# Patient Record
Sex: MALE | Race: OTHER | NOT HISPANIC OR LATINO
[De-identification: names, ages, dates, MRNs, and addresses within clinical notes are randomized per-mention and may not be internally consistent; named-entity substitution may affect disease eponyms.]

---

## 2017-01-23 ENCOUNTER — APPOINTMENT (OUTPATIENT)
Dept: ENDOCRINOLOGY | Facility: CLINIC | Age: 47
End: 2017-01-23

## 2017-07-13 ENCOUNTER — APPOINTMENT (OUTPATIENT)
Dept: ENDOCRINOLOGY | Facility: CLINIC | Age: 47
End: 2017-07-13

## 2017-07-13 VITALS
DIASTOLIC BLOOD PRESSURE: 76 MMHG | HEIGHT: 73 IN | SYSTOLIC BLOOD PRESSURE: 120 MMHG | BODY MASS INDEX: 28.76 KG/M2 | OXYGEN SATURATION: 98 % | WEIGHT: 217 LBS | HEART RATE: 72 BPM

## 2017-07-13 DIAGNOSIS — F17.200 NICOTINE DEPENDENCE, UNSPECIFIED, UNCOMPLICATED: ICD-10-CM

## 2017-07-13 LAB
GLUCOSE BLDC GLUCOMTR-MCNC: 167
HBA1C MFR BLD HPLC: 9.9

## 2017-07-14 LAB
ALBUMIN SERPL ELPH-MCNC: 5.1 G/DL
ALP BLD-CCNC: 74 U/L
ALT SERPL-CCNC: 21 U/L
ANION GAP SERPL CALC-SCNC: 17 MMOL/L
AST SERPL-CCNC: 15 U/L
BILIRUB SERPL-MCNC: 0.6 MG/DL
BUN SERPL-MCNC: 30 MG/DL
CALCIUM SERPL-MCNC: 10.5 MG/DL
CHLORIDE SERPL-SCNC: 102 MMOL/L
CHOLEST SERPL-MCNC: 212 MG/DL
CHOLEST/HDLC SERPL: 4 RATIO
CO2 SERPL-SCNC: 22 MMOL/L
CREAT SERPL-MCNC: 1.05 MG/DL
CREAT SPEC-SCNC: 151 MG/DL
GLUCOSE SERPL-MCNC: 84 MG/DL
HDLC SERPL-MCNC: 53 MG/DL
LDLC SERPL CALC-MCNC: 120 MG/DL
MICROALBUMIN 24H UR DL<=1MG/L-MCNC: 7.9 MG/DL
MICROALBUMIN/CREAT 24H UR-RTO: 52 MG/G
POTASSIUM SERPL-SCNC: 4.6 MMOL/L
PROT SERPL-MCNC: 7.9 G/DL
SODIUM SERPL-SCNC: 141 MMOL/L
TRIGL SERPL-MCNC: 193 MG/DL

## 2017-11-02 ENCOUNTER — APPOINTMENT (OUTPATIENT)
Dept: ENDOCRINOLOGY | Facility: CLINIC | Age: 47
End: 2017-11-02
Payer: COMMERCIAL

## 2017-11-02 VITALS
OXYGEN SATURATION: 99 % | BODY MASS INDEX: 30.22 KG/M2 | HEIGHT: 73 IN | SYSTOLIC BLOOD PRESSURE: 110 MMHG | DIASTOLIC BLOOD PRESSURE: 70 MMHG | WEIGHT: 228 LBS | HEART RATE: 83 BPM

## 2017-11-02 LAB
GLUCOSE BLDC GLUCOMTR-MCNC: 142
HBA1C MFR BLD HPLC: 9.3

## 2017-11-02 PROCEDURE — 82962 GLUCOSE BLOOD TEST: CPT

## 2017-11-02 PROCEDURE — 83036 HEMOGLOBIN GLYCOSYLATED A1C: CPT | Mod: QW

## 2017-11-02 PROCEDURE — 99215 OFFICE O/P EST HI 40 MIN: CPT | Mod: 25

## 2017-11-03 LAB
CHOLEST SERPL-MCNC: 234 MG/DL
CHOLEST/HDLC SERPL: 4.9 RATIO
HDLC SERPL-MCNC: 48 MG/DL
LDLC SERPL CALC-MCNC: 134 MG/DL
TRIGL SERPL-MCNC: 261 MG/DL

## 2018-03-08 ENCOUNTER — APPOINTMENT (OUTPATIENT)
Dept: ENDOCRINOLOGY | Facility: CLINIC | Age: 48
End: 2018-03-08

## 2018-03-23 ENCOUNTER — APPOINTMENT (OUTPATIENT)
Dept: OPHTHALMOLOGY | Facility: CLINIC | Age: 48
End: 2018-03-23
Payer: COMMERCIAL

## 2018-03-23 DIAGNOSIS — H40.003 PREGLAUCOMA, UNSPECIFIED, BILATERAL: ICD-10-CM

## 2018-03-23 DIAGNOSIS — E11.3293 TYPE 2 DIABETES MELLITUS WITH MILD NONPROLIFERATIVE DIABETIC RETINOPATHY WITHOUT MACULAR EDEMA, BILATERAL: ICD-10-CM

## 2018-03-23 PROCEDURE — 92134 CPTRZ OPH DX IMG PST SGM RTA: CPT

## 2018-03-23 PROCEDURE — 92004 COMPRE OPH EXAM NEW PT 1/>: CPT

## 2018-03-27 ENCOUNTER — RX RENEWAL (OUTPATIENT)
Age: 48
End: 2018-03-27

## 2018-03-28 PROBLEM — E11.3293 MILD NONPROLIFERATIVE DIABETIC RETINOPATHY OF BOTH EYES WITHOUT MACULAR EDEMA ASSOCIATED WITH TYPE 2 DIABETES MELLITUS: Status: ACTIVE | Noted: 2018-03-28

## 2018-03-28 PROBLEM — H40.003 GLAUCOMA SUSPECT, BILATERAL: Status: ACTIVE | Noted: 2018-03-28

## 2018-05-03 ENCOUNTER — APPOINTMENT (OUTPATIENT)
Dept: ENDOCRINOLOGY | Facility: CLINIC | Age: 48
End: 2018-05-03

## 2018-06-14 ENCOUNTER — APPOINTMENT (OUTPATIENT)
Dept: CARDIOLOGY | Facility: CLINIC | Age: 48
End: 2018-06-14
Payer: COMMERCIAL

## 2018-06-14 ENCOUNTER — NON-APPOINTMENT (OUTPATIENT)
Age: 48
End: 2018-06-14

## 2018-06-14 VITALS
HEIGHT: 73 IN | WEIGHT: 236 LBS | HEART RATE: 83 BPM | TEMPERATURE: 98.9 F | SYSTOLIC BLOOD PRESSURE: 125 MMHG | DIASTOLIC BLOOD PRESSURE: 80 MMHG | OXYGEN SATURATION: 96 % | BODY MASS INDEX: 31.28 KG/M2

## 2018-06-14 PROCEDURE — 99396 PREV VISIT EST AGE 40-64: CPT

## 2018-06-14 PROCEDURE — 93000 ELECTROCARDIOGRAM COMPLETE: CPT

## 2018-06-14 PROCEDURE — 99214 OFFICE O/P EST MOD 30 MIN: CPT | Mod: 25

## 2018-06-15 LAB
25(OH)D3 SERPL-MCNC: 18.8 NG/ML
ALBUMIN SERPL ELPH-MCNC: 4.4 G/DL
ALP BLD-CCNC: 81 U/L
ALT SERPL-CCNC: 31 U/L
ANION GAP SERPL CALC-SCNC: 17 MMOL/L
AST SERPL-CCNC: 23 U/L
BASOPHILS # BLD AUTO: 0.03 K/UL
BASOPHILS NFR BLD AUTO: 0.5 %
BILIRUB SERPL-MCNC: 0.6 MG/DL
BUN SERPL-MCNC: 17 MG/DL
CALCIUM SERPL-MCNC: 9.7 MG/DL
CHLORIDE SERPL-SCNC: 98 MMOL/L
CHOLEST SERPL-MCNC: 193 MG/DL
CHOLEST/HDLC SERPL: 4.6 RATIO
CO2 SERPL-SCNC: 22 MMOL/L
CREAT SERPL-MCNC: 0.91 MG/DL
EOSINOPHIL # BLD AUTO: 0.37 K/UL
EOSINOPHIL NFR BLD AUTO: 6.1 %
ESTIMATED AVERAGE GLUCOSE: 289 MG/DL
GLUCOSE SERPL-MCNC: 263 MG/DL
HBA1C MFR BLD HPLC: 11.7 %
HCT VFR BLD CALC: 49 %
HDLC SERPL-MCNC: 42 MG/DL
HGB BLD-MCNC: 16.4 G/DL
IMM GRANULOCYTES NFR BLD AUTO: 1.5 %
LDLC SERPL CALC-MCNC: 87 MG/DL
LYMPHOCYTES # BLD AUTO: 2.1 K/UL
LYMPHOCYTES NFR BLD AUTO: 34.7 %
MAN DIFF?: NORMAL
MCHC RBC-ENTMCNC: 31.7 PG
MCHC RBC-ENTMCNC: 33.5 GM/DL
MCV RBC AUTO: 94.8 FL
MONOCYTES # BLD AUTO: 0.31 K/UL
MONOCYTES NFR BLD AUTO: 5.1 %
NEUTROPHILS # BLD AUTO: 3.15 K/UL
NEUTROPHILS NFR BLD AUTO: 52.1 %
PLATELET # BLD AUTO: 203 K/UL
POTASSIUM SERPL-SCNC: 5 MMOL/L
PROT SERPL-MCNC: 7.1 G/DL
RBC # BLD: 5.17 M/UL
RBC # FLD: 13.3 %
SODIUM SERPL-SCNC: 137 MMOL/L
TRIGL SERPL-MCNC: 320 MG/DL
WBC # FLD AUTO: 6.05 K/UL

## 2018-06-16 LAB
T4 SERPL-MCNC: 8.1 UG/DL
TSH SERPL-ACNC: 2.39 UIU/ML

## 2018-08-07 ENCOUNTER — APPOINTMENT (OUTPATIENT)
Dept: ENDOCRINOLOGY | Facility: CLINIC | Age: 48
End: 2018-08-07
Payer: COMMERCIAL

## 2018-08-07 VITALS
BODY MASS INDEX: 30.62 KG/M2 | HEIGHT: 73 IN | SYSTOLIC BLOOD PRESSURE: 110 MMHG | HEART RATE: 86 BPM | DIASTOLIC BLOOD PRESSURE: 68 MMHG | OXYGEN SATURATION: 97 % | WEIGHT: 231 LBS

## 2018-08-07 PROCEDURE — 99215 OFFICE O/P EST HI 40 MIN: CPT

## 2018-08-08 LAB
CREAT SPEC-SCNC: 55 MG/DL
MICROALBUMIN 24H UR DL<=1MG/L-MCNC: 2.2 MG/DL
MICROALBUMIN/CREAT 24H UR-RTO: 40 MG/G

## 2018-10-18 ENCOUNTER — APPOINTMENT (OUTPATIENT)
Dept: CARDIOLOGY | Facility: CLINIC | Age: 48
End: 2018-10-18

## 2018-11-08 ENCOUNTER — APPOINTMENT (OUTPATIENT)
Dept: ENDOCRINOLOGY | Facility: CLINIC | Age: 48
End: 2018-11-08
Payer: COMMERCIAL

## 2018-11-08 VITALS
SYSTOLIC BLOOD PRESSURE: 140 MMHG | DIASTOLIC BLOOD PRESSURE: 80 MMHG | HEIGHT: 73 IN | OXYGEN SATURATION: 98 % | WEIGHT: 250 LBS | BODY MASS INDEX: 33.13 KG/M2 | HEART RATE: 108 BPM

## 2018-11-08 LAB
GLUCOSE BLDC GLUCOMTR-MCNC: 200
HBA1C MFR BLD HPLC: 9.4

## 2018-11-08 PROCEDURE — 95251 CONT GLUC MNTR ANALYSIS I&R: CPT

## 2018-11-08 PROCEDURE — 83036 HEMOGLOBIN GLYCOSYLATED A1C: CPT | Mod: QW

## 2018-11-08 PROCEDURE — 82962 GLUCOSE BLOOD TEST: CPT

## 2018-11-08 PROCEDURE — 99215 OFFICE O/P EST HI 40 MIN: CPT | Mod: 25

## 2018-11-13 LAB
25(OH)D3 SERPL-MCNC: 19.8 NG/ML
ANION GAP SERPL CALC-SCNC: 16 MMOL/L
BUN SERPL-MCNC: 18 MG/DL
CALCIUM SERPL-MCNC: 10 MG/DL
CHLORIDE SERPL-SCNC: 101 MMOL/L
CO2 SERPL-SCNC: 25 MMOL/L
CREAT SERPL-MCNC: 1.23 MG/DL
GLUCOSE SERPL-MCNC: 96 MG/DL
POTASSIUM SERPL-SCNC: 4.1 MMOL/L
SODIUM SERPL-SCNC: 142 MMOL/L
VIT B12 SERPL-MCNC: 1210 PG/ML

## 2018-11-29 ENCOUNTER — RX RENEWAL (OUTPATIENT)
Age: 48
End: 2018-11-29

## 2018-12-20 ENCOUNTER — APPOINTMENT (OUTPATIENT)
Dept: ENDOCRINOLOGY | Facility: CLINIC | Age: 48
End: 2018-12-20

## 2018-12-21 ENCOUNTER — CLINICAL ADVICE (OUTPATIENT)
Age: 48
End: 2018-12-21

## 2019-02-11 ENCOUNTER — APPOINTMENT (OUTPATIENT)
Dept: ENDOCRINOLOGY | Facility: CLINIC | Age: 49
End: 2019-02-11
Payer: COMMERCIAL

## 2019-02-11 VITALS
SYSTOLIC BLOOD PRESSURE: 132 MMHG | BODY MASS INDEX: 32.98 KG/M2 | HEART RATE: 102 BPM | OXYGEN SATURATION: 98 % | DIASTOLIC BLOOD PRESSURE: 80 MMHG | WEIGHT: 250 LBS

## 2019-02-11 LAB
GLUCOSE BLDC GLUCOMTR-MCNC: 179
HBA1C MFR BLD HPLC: 7.1

## 2019-02-11 PROCEDURE — 99214 OFFICE O/P EST MOD 30 MIN: CPT | Mod: 25

## 2019-02-11 PROCEDURE — 95251 CONT GLUC MNTR ANALYSIS I&R: CPT

## 2019-02-11 PROCEDURE — 82962 GLUCOSE BLOOD TEST: CPT

## 2019-02-11 PROCEDURE — 83036 HEMOGLOBIN GLYCOSYLATED A1C: CPT | Mod: QW

## 2019-05-20 ENCOUNTER — APPOINTMENT (OUTPATIENT)
Dept: ENDOCRINOLOGY | Facility: CLINIC | Age: 49
End: 2019-05-20
Payer: COMMERCIAL

## 2019-05-30 ENCOUNTER — APPOINTMENT (OUTPATIENT)
Dept: ENDOCRINOLOGY | Facility: CLINIC | Age: 49
End: 2019-05-30
Payer: COMMERCIAL

## 2019-05-30 VITALS
HEART RATE: 95 BPM | SYSTOLIC BLOOD PRESSURE: 130 MMHG | DIASTOLIC BLOOD PRESSURE: 70 MMHG | OXYGEN SATURATION: 97 % | HEIGHT: 73 IN | WEIGHT: 247 LBS | BODY MASS INDEX: 32.74 KG/M2

## 2019-05-30 DIAGNOSIS — Z87.891 PERSONAL HISTORY OF NICOTINE DEPENDENCE: ICD-10-CM

## 2019-05-30 LAB
GLUCOSE BLDC GLUCOMTR-MCNC: 224
HBA1C MFR BLD HPLC: 9.1

## 2019-05-30 PROCEDURE — 83036 HEMOGLOBIN GLYCOSYLATED A1C: CPT | Mod: QW

## 2019-05-30 PROCEDURE — 95251 CONT GLUC MNTR ANALYSIS I&R: CPT

## 2019-05-30 PROCEDURE — 99215 OFFICE O/P EST HI 40 MIN: CPT | Mod: 25

## 2019-05-30 PROCEDURE — 82962 GLUCOSE BLOOD TEST: CPT

## 2019-05-31 LAB
ALBUMIN SERPL ELPH-MCNC: 4.5 G/DL
ALP BLD-CCNC: 91 U/L
ALT SERPL-CCNC: 51 U/L
ANION GAP SERPL CALC-SCNC: 14 MMOL/L
AST SERPL-CCNC: 30 U/L
BILIRUB SERPL-MCNC: 0.4 MG/DL
BUN SERPL-MCNC: 19 MG/DL
CALCIUM SERPL-MCNC: 10.1 MG/DL
CHLORIDE SERPL-SCNC: 101 MMOL/L
CHOLEST SERPL-MCNC: 168 MG/DL
CHOLEST/HDLC SERPL: 4.4 RATIO
CO2 SERPL-SCNC: 24 MMOL/L
CREAT SERPL-MCNC: 0.91 MG/DL
GLUCOSE SERPL-MCNC: 214 MG/DL
HDLC SERPL-MCNC: 38 MG/DL
LDLC SERPL CALC-MCNC: 62 MG/DL
POTASSIUM SERPL-SCNC: 4.6 MMOL/L
PROT SERPL-MCNC: 7.1 G/DL
SODIUM SERPL-SCNC: 138 MMOL/L
TRIGL SERPL-MCNC: 342 MG/DL

## 2019-10-18 ENCOUNTER — MEDICATION RENEWAL (OUTPATIENT)
Age: 49
End: 2019-10-18

## 2019-10-18 ENCOUNTER — RX RENEWAL (OUTPATIENT)
Age: 49
End: 2019-10-18

## 2019-11-04 ENCOUNTER — RX RENEWAL (OUTPATIENT)
Age: 49
End: 2019-11-04

## 2019-11-04 ENCOUNTER — MEDICATION RENEWAL (OUTPATIENT)
Age: 49
End: 2019-11-04

## 2019-11-05 ENCOUNTER — RX RENEWAL (OUTPATIENT)
Age: 49
End: 2019-11-05

## 2020-07-21 ENCOUNTER — APPOINTMENT (OUTPATIENT)
Dept: ENDOCRINOLOGY | Facility: CLINIC | Age: 50
End: 2020-07-21
Payer: COMMERCIAL

## 2020-07-21 VITALS
OXYGEN SATURATION: 97 % | DIASTOLIC BLOOD PRESSURE: 74 MMHG | BODY MASS INDEX: 30.62 KG/M2 | HEART RATE: 77 BPM | WEIGHT: 231 LBS | HEIGHT: 73 IN | SYSTOLIC BLOOD PRESSURE: 120 MMHG

## 2020-07-21 DIAGNOSIS — E11.65 TYPE 2 DIABETES MELLITUS WITH HYPERGLYCEMIA: ICD-10-CM

## 2020-07-21 LAB — GLUCOSE BLDC GLUCOMTR-MCNC: 244

## 2020-07-21 PROCEDURE — 99215 OFFICE O/P EST HI 40 MIN: CPT | Mod: 25

## 2020-07-21 PROCEDURE — 82962 GLUCOSE BLOOD TEST: CPT

## 2020-07-21 NOTE — REVIEW OF SYSTEMS
[Dry Skin] : dry skin [Fatigue] : no fatigue [Recent Weight Gain (___ Lbs)] : no recent weight gain [Decreased Appetite] : appetite not decreased [Recent Weight Loss (___ Lbs)] : no recent weight loss [Dry Eyes] : no dryness [Visual Field Defect] : no visual field defect [Eye Pain] : no pain [Neck Pain] : no neck pain [Dysphagia] : no dysphagia [Dysphonia] : no dysphonia [Chest Pain] : no chest pain [Shortness Of Breath] : no shortness of breath [Palpitations] : no palpitations [Lower Ext Edema] : no lower extremity edema [SOB on Exertion] : no shortness of breath on exertion [Cough] : no cough [Constipation] : no constipation [Abdominal Pain] : no abdominal pain [Nausea] : no nausea [Vomiting] : no vomiting [Diarrhea] : no diarrhea [Dysuria] : no dysuria [Polyuria] : no polyuria [Muscle Weakness] : no muscle weakness [Acanthosis] : no acanthosis  [Joint Pain] : no joint pain [Dizziness] : no dizziness [Headaches] : no headaches [Depression] : no depression [Insomnia] : no insomnia [Tremors] : no tremors [Polydipsia] : no polydipsia [Anxiety] : no anxiety [Heat Intolerance] : no heat intolerance [Easy Bleeding] : no ~M tendency for easy bleeding [Cold Intolerance] : no cold intolerance [Easy Bruising] : no tendency for easy bruising [Swelling] : no swelling

## 2020-07-21 NOTE — ASSESSMENT
[FreeTextEntry1] : Choco Enriquez is a 49 yr old female, who presents today to establish care  in regards type 2 diabetes.  \par She has seen Dr. Harman Hyde so far for his care, now recently moved here.\par \par Currently taking  Metformin 1000 twice daily, Trulicity 1.5 and Tresiba 160 units daily and Novolog 60 units before meals.  \par A1C   was 9.1% in 5/2019.\par Has not checked his sugars in months.\par \par Medication changes:\par Will refill sensors, patient to start checking sugars.\par Discussed with him how dangerous it is to take insulin that too in big doses without checking sugars, he voiced understanding.\par To continue all his meds.\par  To check sugars 4 x a day  at different times.\par Diet and exercise reviewed.\par Hypoglycemia reviewed.\par  Eyes:no  active issues, not up to date for exams. to schedule for this year\par Feet: no  active issues, no  neuropathy. up to date for podiatric care. Diabetic foot care reviewed.\par \par  Lipids: Dyslipidemia: on  statin/ anti-lipid treatment. Last LDL: 62, will repeat this year.\par \par  Renal/ B/P : B/P wnl ,on ACE/ ARB. will check for proteinuria.  \par Weight:Overweight    Balanced diet and exercise reviewed.\par \par Vitamin D def: will check levels this visit.\par \par  EDUCATION: ADA diet (30-50 gm carbohydrates with each meal, 15 grams with snacks. Balance with lean proteins and low fat diet.) Exercise daily per ability, work up to 30 minutes a day. Medication, risks and benefits reviewed. Glucose testing and insulin administration for glycemic management.\par  Influenza vaccination \par \par FOLLOWUP@4 months with labs\par

## 2020-07-21 NOTE — HISTORY OF PRESENT ILLNESS
[FreeTextEntry1] : Choco Enriquez is a 49 yr old female, who presents today to establish care  in regards type 2 diabetes.  \par Per patient , has been diabetic since 10 years.\par Family history includes mother with diabetes\par \par She has seen Dr. Harman Hyde so far for his care, now recently moved here.\par \par Currently taking  Metformin 1000 twice daily, Trulicity 1.5 and Tresiba 160 units daily and Novolog 60 units before meals.  \par A1C   was 9.1% in 5/2019.\par \par He had free style kaila, he ran out of sensors in April, since then he has also not checked sugars at all.\par \par Denies feeling lows.\par \par Says diet is not bad, avoids junk.\par Eats mostly 1 meal  a day, other times has unsweetened coffee , fruits.\par \par Diabetic History\par ER Visits:  none\par Hospitalizations:   none\par Diet Therapy: now watching his diet\par Diabetic Education:   no\par Exercise:   not very  active\par Last eye exam: gets yearly, no changes, has to schedule for this year. \par \par Takes MVI daily, no extra vitamin D.\par Takes statin daily.\par \par \par

## 2020-07-21 NOTE — PHYSICAL EXAM
[Alert] : alert [Well Nourished] : well nourished [Healthy Appearance] : healthy appearance [No Acute Distress] : no acute distress [Normal Sclera/Conjunctiva] : normal sclera/conjunctiva [No Proptosis] : no proptosis [No Lid Lag] : no lid lag [No Neck Mass] : no neck mass was observed [Supple] : the neck was supple [Thyroid Not Enlarged] : the thyroid was not enlarged [No Thyroid Nodules] : no palpable thyroid nodules [No Respiratory Distress] : no respiratory distress [No Accessory Muscle Use] : no accessory muscle use [Normal Rate and Effort] : normal respiratory rate and effort [Clear to Auscultation] : lungs were clear to auscultation bilaterally [Normal S1, S2] : normal S1 and S2 [No Murmurs] : no murmurs [Normal Rate] : heart rate was normal [Regular Rhythm] : with a regular rhythm [Normal Bowel Sounds] : normal bowel sounds [Not Tender] : non-tender [Not Distended] : not distended [Soft] : abdomen soft [Normal Supraclavicular Nodes] : no supraclavicular lymphadenopathy [Normal Anterior Cervical Nodes] : no anterior cervical lymphadenopathy [Normal Posterior Cervical Nodes] : no posterior cervical lymphadenopathy [Normal Gait] : normal gait [No Clubbing, Cyanosis] : no clubbing  or cyanosis of the fingernails [No Joint Swelling] : no joint swelling seen [No Rash] : no rash [No Skin Lesions] : no skin lesions [No Sensory Deficits] : the sensory exam was normal to light touch and pinprick [No Motor Deficits] : the motor exam was normal [No Tremors] : no tremors [Oriented x3] : oriented to person, place, and time [Normal Affect] : the affect was normal [Normal Insight/Judgement] : insight and judgment were intact [Normal Mood] : the mood was normal [Right Foot Was Examined] : right foot ~C was examined [Left Foot Was Examined] : left foot ~C was examined [Normal] : normal [Acanthosis Nigricans] : no acanthosis nigricans [Diminished Throughout Both Feet] : normal tactile sensation with monofilament testing throughout both feet

## 2020-10-02 ENCOUNTER — LABORATORY RESULT (OUTPATIENT)
Age: 50
End: 2020-10-02

## 2020-11-24 ENCOUNTER — APPOINTMENT (OUTPATIENT)
Dept: ENDOCRINOLOGY | Facility: CLINIC | Age: 50
End: 2020-11-24

## 2021-01-12 ENCOUNTER — APPOINTMENT (OUTPATIENT)
Dept: ENDOCRINOLOGY | Facility: CLINIC | Age: 51
End: 2021-01-12
Payer: COMMERCIAL

## 2021-01-12 VITALS
OXYGEN SATURATION: 97 % | BODY MASS INDEX: 31.14 KG/M2 | DIASTOLIC BLOOD PRESSURE: 80 MMHG | HEART RATE: 97 BPM | SYSTOLIC BLOOD PRESSURE: 130 MMHG | WEIGHT: 235 LBS | HEIGHT: 73 IN

## 2021-01-12 LAB — GLUCOSE BLDC GLUCOMTR-MCNC: 94

## 2021-01-12 PROCEDURE — 99214 OFFICE O/P EST MOD 30 MIN: CPT | Mod: 25

## 2021-01-12 PROCEDURE — 99072 ADDL SUPL MATRL&STAF TM PHE: CPT

## 2021-01-12 PROCEDURE — 95251 CONT GLUC MNTR ANALYSIS I&R: CPT

## 2021-01-12 PROCEDURE — 82962 GLUCOSE BLOOD TEST: CPT

## 2021-01-12 RX ORDER — PEN NEEDLE, DIABETIC 29 G X1/2"
32G X 4 MM NEEDLE, DISPOSABLE MISCELLANEOUS
Qty: 5 | Refills: 3 | Status: DISCONTINUED | COMMUNITY
Start: 2018-11-29 | End: 2021-01-12

## 2021-01-12 NOTE — PHYSICAL EXAM
[Alert] : alert [Well Nourished] : well nourished [Healthy Appearance] : healthy appearance [No Acute Distress] : no acute distress [Normal Sclera/Conjunctiva] : normal sclera/conjunctiva [No Proptosis] : no proptosis [No Lid Lag] : no lid lag [No Neck Mass] : no neck mass was observed [Supple] : the neck was supple [Thyroid Not Enlarged] : the thyroid was not enlarged [No Thyroid Nodules] : no palpable thyroid nodules [No Respiratory Distress] : no respiratory distress [No Accessory Muscle Use] : no accessory muscle use [Normal Rate and Effort] : normal respiratory rate and effort [Clear to Auscultation] : lungs were clear to auscultation bilaterally [Normal S1, S2] : normal S1 and S2 [No Murmurs] : no murmurs [Normal Rate] : heart rate was normal [Regular Rhythm] : with a regular rhythm [Normal Bowel Sounds] : normal bowel sounds [Not Tender] : non-tender [Not Distended] : not distended [Soft] : abdomen soft [Normal Supraclavicular Nodes] : no supraclavicular lymphadenopathy [Normal Anterior Cervical Nodes] : no anterior cervical lymphadenopathy [No Rash] : no rash [No Skin Lesions] : no skin lesions [Right Foot Was Examined] : right foot ~C was examined [Left Foot Was Examined] : left foot ~C was examined [Normal] : normal [Oriented x3] : oriented to person, place, and time [Normal Affect] : the affect was normal [Normal Insight/Judgement] : insight and judgment were intact [Normal Mood] : the mood was normal [Acanthosis Nigricans] : no acanthosis nigricans [Diminished Throughout Both Feet] : normal tactile sensation with monofilament testing throughout both feet

## 2021-01-12 NOTE — HISTORY OF PRESENT ILLNESS
[FreeTextEntry1] : Choco Enriquez is a 49 yr old female, who presents today for follow up   in regards type 2 diabetes.  \par Per patient , has been diabetic since 10 years.\par Family history includes mother with diabetes\par \par She has seen Dr. Harman Hyde so far for his care, now recently moved here.\par \par Currently taking  Metformin 1000 twice daily, Trulicity 1.5 mg weekly, Tresiba 160 units daily and Novolog 60 units before meals.  \par \par Admits to missing dinner doses a lot.\par \par A1C   was 9.1% in 5/2019, 8.6% in 10/2020\par Fingerstick today 94\par \par He has free style kaila.\par Downloaded in clinic today:\par From dec 30th till Jan 12:\par Avg glucose: 147\par Glu variability: 44.5%\par In target range:62%\par Above range:29%\par Below range: 9%\par \par Denies feeling lows.\par Highs if he skips dinner dose, if he takes his meds, his sugars are in good ramnge, rare lows if he takes insulin forgets to eat.\par \par Says diet is not bad, avoids junk.\par Eats mostly 1 meal  a day, other times has unsweetened coffee , fruits.\par \par Diabetic History\par ER Visits:  none\par Hospitalizations:   none\par Diet Therapy: now watching his diet\par Diabetic Education:   no\par Exercise:   not very  active\par Last eye exam: gets yearly, no changes, has to schedule for this year. \par \par Takes MVI daily, no extra vitamin D.\par Takes statin daily.\par \par \par

## 2021-01-12 NOTE — REVIEW OF SYSTEMS
[Dry Skin] : dry skin [Fatigue] : no fatigue [Decreased Appetite] : appetite not decreased [Recent Weight Gain (___ Lbs)] : no recent weight gain [Recent Weight Loss (___ Lbs)] : no recent weight loss [Visual Field Defect] : no visual field defect [Dry Eyes] : no dryness [Eye Pain] : no pain [Dysphagia] : no dysphagia [Neck Pain] : no neck pain [Dysphonia] : no dysphonia [Chest Pain] : no chest pain [Palpitations] : no palpitations [Lower Ext Edema] : no lower extremity edema [Shortness Of Breath] : no shortness of breath [Cough] : no cough [SOB on Exertion] : no shortness of breath on exertion [Nausea] : no nausea [Constipation] : no constipation [Abdominal Pain] : no abdominal pain [Vomiting] : no vomiting [Diarrhea] : no diarrhea [Polyuria] : no polyuria [Dysuria] : no dysuria [Joint Pain] : no joint pain [Muscle Weakness] : no muscle weakness [Acanthosis] : no acanthosis  [Headaches] : no headaches [Dizziness] : no dizziness [Tremors] : no tremors [Depression] : no depression [Insomnia] : no insomnia [Anxiety] : no anxiety [Polydipsia] : no polydipsia [Cold Intolerance] : no cold intolerance [Heat Intolerance] : no heat intolerance [Easy Bleeding] : no ~M tendency for easy bleeding [Easy Bruising] : no tendency for easy bruising [Swelling] : no swelling

## 2021-01-12 NOTE — ASSESSMENT
[FreeTextEntry1] : Choco Enriquez is a 49 yr old female, who presents today for follow up   in regards type 2 diabetes.  \par Per patient , has been diabetic since 10 years.\par Family history includes mother with diabetes\par \par She has seen Dr. Harman Hyde so far for his care, now recently moved here.\par \par Currently taking  Metformin 1000 twice daily, Trulicity 1.5 mg weekly, Tresiba 160 units daily and Novolog 60 units before meals.  \par \par Admits to missing dinner doses a lot.\par \par A1C   was 9.1% in 5/2019, 8.6% in 10/2020\par Fingerstick today 94\par \par Did  not get blood work before appointment , will get blood work today.\par \par Medication changes:\par Has been missing doses at this point will not make any further changes, first not to miss doses..\par To continue all his meds.\par  To check sugars 4 x a day  at different times.\par Diet and exercise reviewed.\par Hypoglycemia reviewed.\par  Eyes:no  active issues, not up to date for exams. to schedule for this year, reminded again.\par Feet: no  active issues, no  neuropathy. up to date for podiatric care. Diabetic foot care reviewed.\par \par  Lipids: Dyslipidemia: on  statin/ anti-lipid treatment. Last LDL: 32\par \par  Renal/ B/P : B/P wnl ,on ACE/ ARB. has had  proteinuria. to go up on losartan.  will repeat next visit.\par Weight:Overweight    Balanced diet and exercise reviewed.\par \par Vitamin D def: on replacement.  will check levels this visit.\par \par  EDUCATION: ADA diet (30-50 gm carbohydrates with each meal, 15 grams with snacks. Balance with lean proteins and low fat diet.) Exercise daily per ability, work up to 30 minutes a day. Medication, risks and benefits reviewed. Glucose testing and insulin administration for glycemic management.\par \par \par FOLLOWUP@4 months with labs\par

## 2021-05-11 ENCOUNTER — APPOINTMENT (OUTPATIENT)
Dept: ENDOCRINOLOGY | Facility: CLINIC | Age: 51
End: 2021-05-11

## 2021-07-07 ENCOUNTER — RX RENEWAL (OUTPATIENT)
Age: 51
End: 2021-07-07

## 2021-07-12 ENCOUNTER — RX RENEWAL (OUTPATIENT)
Age: 51
End: 2021-07-12

## 2021-08-06 ENCOUNTER — RX RENEWAL (OUTPATIENT)
Age: 51
End: 2021-08-06

## 2022-02-14 ENCOUNTER — RX RENEWAL (OUTPATIENT)
Age: 52
End: 2022-02-14

## 2022-03-29 ENCOUNTER — APPOINTMENT (OUTPATIENT)
Dept: ENDOCRINOLOGY | Facility: CLINIC | Age: 52
End: 2022-03-29
Payer: COMMERCIAL

## 2022-03-29 VITALS
SYSTOLIC BLOOD PRESSURE: 132 MMHG | TEMPERATURE: 96.9 F | HEART RATE: 92 BPM | RESPIRATION RATE: 16 BRPM | DIASTOLIC BLOOD PRESSURE: 78 MMHG | WEIGHT: 226 LBS | BODY MASS INDEX: 29.95 KG/M2 | HEIGHT: 73 IN | OXYGEN SATURATION: 96 %

## 2022-03-29 PROCEDURE — 99203 OFFICE O/P NEW LOW 30 MIN: CPT

## 2022-03-29 RX ORDER — FLASH GLUCOSE SENSOR
KIT MISCELLANEOUS
Qty: 7 | Refills: 0 | Status: COMPLETED | COMMUNITY
Start: 2018-08-07 | End: 2022-03-29

## 2022-03-29 RX ORDER — FLASH GLUCOSE SCANNING READER
EACH MISCELLANEOUS
Qty: 1 | Refills: 0 | Status: COMPLETED | COMMUNITY
Start: 2018-08-07 | End: 2022-03-29

## 2022-03-29 NOTE — ASSESSMENT
[Diabetes Foot Care] : diabetes foot care [Long Term Vascular Complications] : long term vascular complications of diabetes [Carbohydrate Consistent Diet] : carbohydrate consistent diet [Importance of Diet and Exercise] : importance of diet and exercise to improve glycemic control, achieve weight loss and improve cardiovascular health [Exercise/Effect on Glucose] : exercise/effect on glucose [Hypoglycemia Management] : hypoglycemia management [Action and use of Insulin] : action and use of short and long-acting insulin [Self Monitoring of Blood Glucose] : self monitoring of blood glucose [Injection Technique, Storage, Sharps Disposal] : injection technique, storage, and sharps disposal [Sick-Day Management] : sick-day management [Retinopathy Screening] : Patient was referred to ophthalmology for retinopathy screening [Weight Loss] : weight loss [Diabetic Medications] : Risks and benefits of diabetic medications were discussed [FreeTextEntry1] : 1- must exercise\par 2-Low carb diet\par 3- Labs drawn\par Cont meds\par freestyle kaila

## 2022-03-29 NOTE — HISTORY OF PRESENT ILLNESS
[FreeTextEntry1] : 52 yo M with a  hx of Type 2 DM x 15 yrs\par Follows a low carb diet\par Ex- little\par Retinopathy- neg\par Neuropathy- ankle feels knumb\par \par FH- Mother-+ DM [Continuous Glucose Monitoring] : Continuous Glucose Monitoring: No [Finger Stick] : Finger Stick: No [Hypoglycemia] : Patient is not hypoglycemic.

## 2022-03-29 NOTE — THERAPY
[Tresiba] : Tresiba [Novolog] : Novolog [FreeTextEntry9] : 120u [de-identified] : 65u tid [FreeTextEntry7] : Trulicity- 1.5mg q wk

## 2022-03-29 NOTE — REVIEW OF SYSTEMS
[Negative] : Heme/Lymph [Decreased Appetite] : appetite not decreased [Chest Pain] : no chest pain [Palpitations] : no palpitations [Nausea] : no nausea [Constipation] : no constipation [Vomiting] : no vomiting [Diarrhea] : no diarrhea [Pain/Numbness of Digits] : no pain/numbness of digits

## 2022-03-29 NOTE — PHYSICAL EXAM
[Alert] : alert [No Acute Distress] : no acute distress [PERRL] : pupils equal, round and reactive to light [No Neck Mass] : no neck mass was observed [Thyroid Not Enlarged] : the thyroid was not enlarged [No Respiratory Distress] : no respiratory distress [Clear to Auscultation] : lungs were clear to auscultation bilaterally [Normal Rate] : heart rate was normal [Regular Rhythm] : with a regular rhythm [Pedal Pulses Normal] : the pedal pulses are present [Soft] : abdomen soft [Spine Straight] : spine straight [Normal Gait] : normal gait [No Joint Swelling] : no joint swelling seen [Right foot was examined, including] : right foot ~C was examined, including visual inspection with sensory and pulse exams [Left foot was examined, including] : left foot ~C was examined, including visual inspection with sensory and pulse exams [Normal] : normal [Normal Reflexes] : deep tendon reflexes were 2+ and symmetric [No Tremors] : no tremors [Oriented x3] : oriented to person, place, and time [Normal Affect] : the affect was normal [Normal Insight/Judgement] : insight and judgment were intact [Diminished Throughout Both Feet] : normal tactile sensation with monofilament testing throughout both feet

## 2022-03-30 LAB
ALBUMIN SERPL ELPH-MCNC: 4.7 G/DL
ALP BLD-CCNC: 134 U/L
ALT SERPL-CCNC: 13 U/L
ANION GAP SERPL CALC-SCNC: 17 MMOL/L
AST SERPL-CCNC: 13 U/L
BILIRUB SERPL-MCNC: 0.6 MG/DL
BUN SERPL-MCNC: 17 MG/DL
CALCIUM SERPL-MCNC: 10.4 MG/DL
CHLORIDE SERPL-SCNC: 97 MMOL/L
CHOLEST SERPL-MCNC: 272 MG/DL
CO2 SERPL-SCNC: 23 MMOL/L
CREAT SERPL-MCNC: 0.74 MG/DL
CREAT SPEC-SCNC: 43 MG/DL
EGFR: 111 ML/MIN/1.73M2
ESTIMATED AVERAGE GLUCOSE: 338 MG/DL
GLUCOSE SERPL-MCNC: 374 MG/DL
HBA1C MFR BLD HPLC: 13.4 %
HDLC SERPL-MCNC: 37 MG/DL
LDLC SERPL CALC-MCNC: NORMAL MG/DL
MICROALBUMIN 24H UR DL<=1MG/L-MCNC: 14.8 MG/DL
MICROALBUMIN/CREAT 24H UR-RTO: 344 MG/G
NONHDLC SERPL-MCNC: 235 MG/DL
POTASSIUM SERPL-SCNC: 4.9 MMOL/L
PROT SERPL-MCNC: 7.1 G/DL
SODIUM SERPL-SCNC: 137 MMOL/L
TRIGL SERPL-MCNC: 750 MG/DL

## 2022-06-20 ENCOUNTER — APPOINTMENT (OUTPATIENT)
Dept: ENDOCRINOLOGY | Facility: CLINIC | Age: 52
End: 2022-06-20

## 2022-10-25 ENCOUNTER — RX RENEWAL (OUTPATIENT)
Age: 52
End: 2022-10-25

## 2022-11-02 ENCOUNTER — NON-APPOINTMENT (OUTPATIENT)
Age: 52
End: 2022-11-02

## 2022-11-02 ENCOUNTER — APPOINTMENT (OUTPATIENT)
Dept: FAMILY MEDICINE | Facility: CLINIC | Age: 52
End: 2022-11-02

## 2022-11-02 VITALS
HEART RATE: 80 BPM | BODY MASS INDEX: 29.16 KG/M2 | HEIGHT: 73 IN | OXYGEN SATURATION: 98 % | TEMPERATURE: 97.6 F | RESPIRATION RATE: 16 BRPM | WEIGHT: 220 LBS | DIASTOLIC BLOOD PRESSURE: 78 MMHG | SYSTOLIC BLOOD PRESSURE: 132 MMHG

## 2022-11-02 PROCEDURE — 36415 COLL VENOUS BLD VENIPUNCTURE: CPT | Mod: 59

## 2022-11-02 PROCEDURE — 99386 PREV VISIT NEW AGE 40-64: CPT | Mod: 25

## 2022-11-02 RX ORDER — INSULIN DEGLUDEC INJECTION 200 U/ML
200 INJECTION, SOLUTION SUBCUTANEOUS
Qty: 6 | Refills: 1 | Status: ACTIVE | COMMUNITY
Start: 2017-11-02 | End: 1900-01-01

## 2022-11-02 RX ORDER — INSULIN ASPART 100 [IU]/ML
100 INJECTION, SOLUTION INTRAVENOUS; SUBCUTANEOUS
Qty: 4 | Refills: 0 | Status: ACTIVE | COMMUNITY
Start: 2021-01-05 | End: 1900-01-01

## 2022-11-02 NOTE — REVIEW OF SYSTEMS
[Fever] : no fever [Fatigue] : no fatigue [Discharge] : no discharge [Earache] : no earache [Chest Pain] : no chest pain [Shortness Of Breath] : no shortness of breath [Cough] : no cough [Abdominal Pain] : no abdominal pain [Constipation] : no constipation [Diarrhea] : no diarrhea [Dysuria] : no dysuria [Headache] : no headache

## 2022-11-02 NOTE — PHYSICAL EXAM
[No Acute Distress] : no acute distress [Well Nourished] : well nourished [Well Developed] : well developed [Well-Appearing] : well-appearing [Normal Voice/Communication] : normal voice/communication [Normal Sclera/Conjunctiva] : normal sclera/conjunctiva [PERRL] : pupils equal round and reactive to light [EOMI] : extraocular movements intact [Normal Oropharynx] : the oropharynx was normal [Supple] : supple [No Respiratory Distress] : no respiratory distress  [Clear to Auscultation] : lungs were clear to auscultation bilaterally [Normal Rate] : normal rate  [Normal S1, S2] : normal S1 and S2 [Soft] : abdomen soft [Non Tender] : non-tender [Non-distended] : non-distended [Normal Bowel Sounds] : normal bowel sounds [Speech Grossly Normal] : speech grossly normal [Normal Affect] : the affect was normal [Normal Mood] : the mood was normal

## 2022-11-02 NOTE — HISTORY OF PRESENT ILLNESS
[FreeTextEntry1] : New patient physical exam [de-identified] : 52 y/o male is in the office to establish care and presents for annual wellness exam. \par \par History of diabetes mellitus.  Insulin-dependent.  Most recent A1c March 2022 notes suboptimal control.  Patient is on Tresiba, Trulicity, NovoLog, and metformin.\par \par Hyperlipidemia.  On atorvastatin 20 mg daily.\par \par Hypertension.  On losartan 50 mg daily.\par \par Intermittent fungal rash of groin.  Patient requesting refill of clotrimazole/betamethasone for as needed use.\par \par Patient reports compliance to therapy.  States blood sugar can be as high as 200 at times.  Is currently looking for another endocrinologist. Does need refill of all medications.

## 2022-11-02 NOTE — HEALTH RISK ASSESSMENT
[0] : 2) Feeling down, depressed, or hopeless: Not at all (0) [PHQ-2 Negative - No further assessment needed] : PHQ-2 Negative - No further assessment needed [[id number]] : 0

## 2022-11-02 NOTE — PLAN
[FreeTextEntry1] : 51-year-old male for new patient physical exam.  Referral to GI for screening colonoscopy placed.  States he is up-to-date with COVID and flu vaccines.  Routine labs obtained today.\par \par Diabetes mellitus type 2, insulin-dependent.  Uncontrolled.  A1c obtained today.  Meds renewed.  Patient is currently looking for another endocrinologist.\par \par Hypertension.  Continue losartan.\par \par Hyperlipidemia.  On atorvastatin.\par \par All questions answered.  Patient voiced understanding and in agreement to above plan.  Return to clinic as recommended.\par

## 2022-11-03 LAB
25(OH)D3 SERPL-MCNC: 19.4 NG/ML
ALBUMIN SERPL ELPH-MCNC: 4.4 G/DL
ALP BLD-CCNC: 81 U/L
ALT SERPL-CCNC: 17 U/L
ANION GAP SERPL CALC-SCNC: 12 MMOL/L
AST SERPL-CCNC: 14 U/L
BASOPHILS # BLD AUTO: 0.06 K/UL
BASOPHILS NFR BLD AUTO: 1.1 %
BILIRUB SERPL-MCNC: 0.9 MG/DL
BUN SERPL-MCNC: 17 MG/DL
CALCIUM SERPL-MCNC: 9.6 MG/DL
CHLORIDE SERPL-SCNC: 98 MMOL/L
CHOLEST SERPL-MCNC: 198 MG/DL
CO2 SERPL-SCNC: 23 MMOL/L
CREAT SERPL-MCNC: 0.9 MG/DL
EGFR: 105 ML/MIN/1.73M2
EOSINOPHIL # BLD AUTO: 0.3 K/UL
EOSINOPHIL NFR BLD AUTO: 5.3 %
ESTIMATED AVERAGE GLUCOSE: 280 MG/DL
GLUCOSE SERPL-MCNC: 321 MG/DL
HBA1C MFR BLD HPLC: 11.4 %
HCT VFR BLD CALC: 47.3 %
HDLC SERPL-MCNC: 40 MG/DL
HGB BLD-MCNC: 15.4 G/DL
IMM GRANULOCYTES NFR BLD AUTO: 1.2 %
LDLC SERPL CALC-MCNC: 84 MG/DL
LYMPHOCYTES # BLD AUTO: 1.38 K/UL
LYMPHOCYTES NFR BLD AUTO: 24.4 %
MAN DIFF?: NORMAL
MCHC RBC-ENTMCNC: 31.4 PG
MCHC RBC-ENTMCNC: 32.6 GM/DL
MCV RBC AUTO: 96.3 FL
MONOCYTES # BLD AUTO: 0.33 K/UL
MONOCYTES NFR BLD AUTO: 5.8 %
NEUTROPHILS # BLD AUTO: 3.51 K/UL
NEUTROPHILS NFR BLD AUTO: 62.2 %
NONHDLC SERPL-MCNC: 159 MG/DL
PLATELET # BLD AUTO: 209 K/UL
POTASSIUM SERPL-SCNC: 4.6 MMOL/L
PROT SERPL-MCNC: 7.2 G/DL
RBC # BLD: 4.91 M/UL
RBC # FLD: 13.9 %
SODIUM SERPL-SCNC: 133 MMOL/L
TRIGL SERPL-MCNC: 371 MG/DL
TSH SERPL-ACNC: 2.49 UIU/ML
WBC # FLD AUTO: 5.65 K/UL

## 2022-11-03 RX ORDER — ERGOCALCIFEROL 1.25 MG/1
1.25 MG CAPSULE, LIQUID FILLED ORAL
Qty: 12 | Refills: 0 | Status: ACTIVE | COMMUNITY
Start: 2022-11-03 | End: 1900-01-01

## 2022-12-26 ENCOUNTER — NON-APPOINTMENT (OUTPATIENT)
Age: 52
End: 2022-12-26

## 2023-01-09 ENCOUNTER — APPOINTMENT (OUTPATIENT)
Dept: ENDOCRINOLOGY | Facility: CLINIC | Age: 53
End: 2023-01-09
Payer: COMMERCIAL

## 2023-01-09 VITALS
WEIGHT: 216 LBS | HEIGHT: 73 IN | SYSTOLIC BLOOD PRESSURE: 134 MMHG | BODY MASS INDEX: 28.63 KG/M2 | OXYGEN SATURATION: 97 % | DIASTOLIC BLOOD PRESSURE: 74 MMHG | HEART RATE: 93 BPM

## 2023-01-09 LAB
GLUCOSE BLDC GLUCOMTR-MCNC: 328
HBA1C MFR BLD HPLC: 13.4

## 2023-01-09 PROCEDURE — 99205 OFFICE O/P NEW HI 60 MIN: CPT

## 2023-01-09 RX ORDER — LANCETS 33 GAUGE
EACH MISCELLANEOUS
Qty: 100 | Refills: 0 | Status: ACTIVE | COMMUNITY
Start: 2023-01-09 | End: 1900-01-01

## 2023-01-09 RX ORDER — BLOOD-GLUCOSE METER
W/DEVICE EACH MISCELLANEOUS
Qty: 1 | Refills: 0 | Status: ACTIVE | COMMUNITY
Start: 2023-01-09 | End: 1900-01-01

## 2023-01-09 RX ORDER — BLOOD SUGAR DIAGNOSTIC
STRIP MISCELLANEOUS 3 TIMES DAILY
Qty: 100 | Refills: 3 | Status: ACTIVE | COMMUNITY
Start: 2023-01-09 | End: 1900-01-01

## 2023-01-09 RX ORDER — BLOOD-GLUCOSE METER
70 EACH MISCELLANEOUS
Qty: 100 | Refills: 3 | Status: ACTIVE | COMMUNITY
Start: 2023-01-09 | End: 1900-01-01

## 2023-01-10 NOTE — ASSESSMENT
[FreeTextEntry1] : This is a 53 yo M /w a PMH of poorly controlled DM2 c/b mild retinopathy who presents for new evaluation of DM2.Currently not taking his insulin due to burn out of multilpe daily injections. Patient reports in diet history that he typically only eats one meal per day. Therefore, will switch to U500 insulin twice per day and c/w metformin and trulicty. Will reassess glycemic control in one month.\par \par #DM2, poorly controlled due to high insulin resistance and medication non-adherence\par -c/w metformin 1000mg BID\par -patient to start trulicity 3mg weekly this weekend\par -stop tresiba\par -start humulin U500 - 90 units in the morning, 50 units in the evening\par -patient counseled to monitor for hyperglycemia and call if this occurs. He is aware of symptoms and how to treat if it occurs\par -start Freestye Blanche 2 - patient was shown philippe in office that he can download on his [employer] phone\par -ophthalmology referral\par -podiatry referral\par -urine albumin/creatinine ratio\par \par #HTN\par -c/w losartan 50mg daily\par -BP at goal today\par \par #HLD\par -increase atorvastatin to 40mg daily\par -LDL goal <70, now 84\par \par Case discussed with Dr. Loco\par \par Elton Novak MD\par Endocrinology Fellow

## 2023-01-10 NOTE — END OF VISIT
[] : Fellow [FreeTextEntry3] : Patient with severe insulin resistance requiring over 300 units of insulin daily.  He is unfortunately not taking any of his insulin as he is having frustration from taking multiple injections a day.  Given the significant amount of insulin requirement, would recommend to start patient on U- 500 which would give him better absorption of his insulin and decrease his injection burden.  Patient previously requiring close to 350 units of insulin daily however unclear how much she was actually absorbing given such large volumes.  Would recommend to start with half of that dose to see patient's requirement given improved absorption of insulin and blood sugar trends before making further adjustment.  Will start with 90 units in the morning and 60 units in the evening.  We will also prescribe patient a Dexcom to help monitor his blood sugars at home.  Patient will also increase his dose of Trulicity to 3 mg weekly starting this week.\par Patient can be considered for an insulin pump to again decrease his injection burden.  She will likely require U5 100 through his insulin pump should he get a pump in the future.\par Patient will need close follow-up in 6 weeks to discuss blood sugar trends.\par In the meantime, would increase his atorvastatin to 40 mg daily.  Continue with losartan  [Time Spent: ___ minutes] : I have spent [unfilled] minutes of time on the encounter. [>50% of the face to face encounter time was spent on counseling and/or coordination of care for ___] : Greater than 50% of the face to face encounter time was spent on counseling and/or coordination of care for [unfilled]

## 2023-01-10 NOTE — PHYSICAL EXAM
[Alert] : alert [Well Nourished] : well nourished [Healthy Appearance] : healthy appearance [No Acute Distress] : no acute distress [Well Developed] : well developed [Normal Voice/Communication] : normal voice communication [Normal Sclera/Conjunctiva] : normal sclera/conjunctiva [No Neck Mass] : no neck mass was observed [No LAD] : no lymphadenopathy [Thyroid Not Enlarged] : the thyroid was not enlarged [No Thyroid Nodules] : no palpable thyroid nodules [No Respiratory Distress] : no respiratory distress [No Accessory Muscle Use] : no accessory muscle use [Normal Rate and Effort] : normal respiratory rate and effort [No Murmurs] : no murmurs [Normal Rate] : heart rate was normal [Regular Rhythm] : with a regular rhythm [No Edema] : no peripheral edema [No Stigmata of Cushings Syndrome] : no stigmata of Cushings Syndrome [Normal Sensation on Monofilament Testing] : normal sensation on monofilament testing of lower extremities [Oriented x3] : oriented to person, place, and time [Normal Insight/Judgement] : insight and judgment were intact [Abdominal Striae] : no abdominal striae [Acanthosis Nigricans] : no acanthosis nigricans [de-identified] : there is a small laceration along the lateral aspect of the right first toe with crusted blood. No purulence or signs of infection

## 2023-01-10 NOTE — HISTORY OF PRESENT ILLNESS
[FreeTextEntry1] : This is a 51 yo M /w a PMH of DM2 who presents to [facility] care.\par \par Reported to have DM2 for about 15 years. Last HbA1c 11.4% on 11/2/22\par Complications include: neuropathy, retinopathy?\par not using his CGM\par \par Current medications include \par metformin 1000mg twice per day\par Was on trulicity 1.5mg weekly until 2 months ago\par Reports taking 0.25mg ozempic for 2 months \par trulicty 3mg weekly, planned to start next week\par tresiba U200 120units twice per day\par novolog 40 units three times per day before meals\par losartan 50mg daily\par Atorvastatin 20mg daily\par \par 24 hr diet recall\par Breakfast tends to skip - just has coffee with half and half and splenda\par Lunch: skipped\par Dinner: sushi\par drinks diet soda or diet tea\par \par Reports last time he took tresiba 80 units two weeks ago.\par \par Last lipid panel from 11/3/22 showed:\par Total Cholesterol 198\par \par HDL 40\par LDL 84\par \par Urine albumin/creatinine 344 on 3/29/22. Has been rising since microalbuminuria present in 7/2017

## 2023-01-27 ENCOUNTER — APPOINTMENT (OUTPATIENT)
Dept: ENDOCRINOLOGY | Facility: CLINIC | Age: 53
End: 2023-01-27
Payer: COMMERCIAL

## 2023-01-27 PROCEDURE — G0108 DIAB MANAGE TRN  PER INDIV: CPT

## 2023-02-03 ENCOUNTER — NON-APPOINTMENT (OUTPATIENT)
Age: 53
End: 2023-02-03

## 2023-03-03 ENCOUNTER — RX RENEWAL (OUTPATIENT)
Age: 53
End: 2023-03-03

## 2023-03-08 ENCOUNTER — APPOINTMENT (OUTPATIENT)
Dept: ENDOCRINOLOGY | Facility: CLINIC | Age: 53
End: 2023-03-08
Payer: COMMERCIAL

## 2023-03-08 VITALS
WEIGHT: 246 LBS | OXYGEN SATURATION: 98 % | DIASTOLIC BLOOD PRESSURE: 90 MMHG | SYSTOLIC BLOOD PRESSURE: 150 MMHG | HEART RATE: 90 BPM | TEMPERATURE: 96.9 F | HEIGHT: 73 IN | BODY MASS INDEX: 32.6 KG/M2

## 2023-03-08 LAB — HBA1C MFR BLD HPLC: 7.7

## 2023-03-08 PROCEDURE — 83036 HEMOGLOBIN GLYCOSYLATED A1C: CPT | Mod: QW

## 2023-03-08 PROCEDURE — 99215 OFFICE O/P EST HI 40 MIN: CPT | Mod: 25

## 2023-03-08 RX ORDER — FLASH GLUCOSE SENSOR
KIT MISCELLANEOUS
Qty: 6 | Refills: 1 | Status: DISCONTINUED | COMMUNITY
Start: 2022-03-29 | End: 2023-03-08

## 2023-03-08 RX ORDER — BLOOD-GLUCOSE,RECEIVER,CONT
EACH MISCELLANEOUS
Qty: 1 | Refills: 0 | Status: ACTIVE | COMMUNITY
Start: 2023-03-08 | End: 1900-01-01

## 2023-03-08 NOTE — ASSESSMENT
[FreeTextEntry1] : #DM2 with high insulin resistance\par -Patient's blood sugar control has significantly improved since switching over to U-500.  His hemoglobin A1c has improved significantly from 13.4% back in January now down to 7.7% with introduction of U-500 insulin\par -He does mention that he has been having some difficulty obtaining Trulicity due to the shortage and has had to skip doses here and there however he does have a stock at this time.  No side effects from the GLP-1 agonists\par -She has been having difficulty obtaining the Dexcom from the insurance.  He is having blood sugars that are in the 70s in the morning consistently\par Plan:\par -Recommend to decrease his evening U-500 dose from 80 units to 70 units given that his blood sugars have been running in the 70s in the morning\par -Continue with U-500 150 units with breakfast.  He does mention that occasionally he will skip breakfast and will skip his insulin dose.  Recommend that patient can still take the insulin at lunchtime however should reduce the dose by half to 75 units when he is taking it with lunch. \par -We will try to get the Dexcom for the patient as he is unable to start using his OmniPod without the Dexcom at this time.  Once we do get the Dexcom we will get patient set up to start the OmniPod 5 using U-500 insulin. \par -Continue with metformin 1000 mg twice a day and Trulicity 3 mg weekly\par -We will get some labs today\par -ophthalmology referral\par -podiatry referral\par \par #HTN\par -c/w losartan 50mg daily\par -Blood pressure is above goal at this time.  Recommended patient to get a blood pressure machine at home. \par -We will get a urine albumin to creatinine ratio\par \par #HLD\par -Continue with atorvastatin 40 mg daily\par \par

## 2023-03-08 NOTE — PHYSICAL EXAM
[Alert] : alert [Well Nourished] : well nourished [Healthy Appearance] : healthy appearance [No Acute Distress] : no acute distress [Well Developed] : well developed [Normal Voice/Communication] : normal voice communication [Normal Sclera/Conjunctiva] : normal sclera/conjunctiva [No Neck Mass] : no neck mass was observed [No LAD] : no lymphadenopathy [Thyroid Not Enlarged] : the thyroid was not enlarged [No Thyroid Nodules] : no palpable thyroid nodules [No Respiratory Distress] : no respiratory distress [No Accessory Muscle Use] : no accessory muscle use [Normal Rate and Effort] : normal respiratory rate and effort [No Murmurs] : no murmurs [Normal Rate] : heart rate was normal [Regular Rhythm] : with a regular rhythm [No Edema] : no peripheral edema [No Stigmata of Cushings Syndrome] : no stigmata of Cushings Syndrome [Normal Sensation on Monofilament Testing] : normal sensation on monofilament testing of lower extremities [Oriented x3] : oriented to person, place, and time [Normal Insight/Judgement] : insight and judgment were intact [Abdominal Striae] : no abdominal striae [Acanthosis Nigricans] : no acanthosis nigricans [de-identified] : there is a small laceration along the lateral aspect of the right first toe with crusted blood. No purulence or signs of infection

## 2023-03-08 NOTE — HISTORY OF PRESENT ILLNESS
[FreeTextEntry1] : Primary diagnosis: Type 2 DM with severe insulin resistance, hyperlipidemia, hypertension\par \par LOV: 1/2023, patient was started on U500 150 units in AM and 80 units in PM.  Patient was unable to get Dexcom approved, does have a OmniPod at home however cannot use it due to the Dexcom.\par \par Diagnosis: 15 years ago \par Last HbA1c 11.4% on 11/2/22-->7.7% today\par not using his CGM is unable to get it approved through insurance company\par Current regimen: U-500 150 units a.m. and 80 units in p.m. + metformin 1000 mg twice a day + Trulicity 3 mg weekly on Saturdays\par Fasting- 70s- 90s\par Before dinner- 110-120\par Hypoglycemia -frequently in the 70s in the morning\par \par Patient is due for eye exam.\par Denies any issues with the feet\par \par losartan 50mg daily\par Atorvastatin 20mg daily\par \par Last lipid panel from 11/3/22 showed:\par Total Cholesterol 198\par \par HDL 40\par LDL 84\par \par Urine albumin/creatinine 344 on 3/29/22. Has been rising since microalbuminuria present in 7/2017

## 2023-03-09 LAB
ALBUMIN SERPL ELPH-MCNC: 4.3 G/DL
ALP BLD-CCNC: 67 U/L
ALT SERPL-CCNC: 23 U/L
ANION GAP SERPL CALC-SCNC: 13 MMOL/L
AST SERPL-CCNC: 18 U/L
BILIRUB SERPL-MCNC: 0.7 MG/DL
BUN SERPL-MCNC: 18 MG/DL
CALCIUM SERPL-MCNC: 9.9 MG/DL
CHLORIDE SERPL-SCNC: 104 MMOL/L
CHOLEST SERPL-MCNC: 161 MG/DL
CO2 SERPL-SCNC: 22 MMOL/L
CREAT SERPL-MCNC: 0.75 MG/DL
CREAT SPEC-SCNC: 109 MG/DL
EGFR: 110 ML/MIN/1.73M2
GLUCOSE SERPL-MCNC: 179 MG/DL
HDLC SERPL-MCNC: 41 MG/DL
LDLC SERPL CALC-MCNC: 63 MG/DL
MICROALBUMIN 24H UR DL<=1MG/L-MCNC: 29 MG/DL
MICROALBUMIN/CREAT 24H UR-RTO: 267 MG/G
NONHDLC SERPL-MCNC: 120 MG/DL
POTASSIUM SERPL-SCNC: 4.5 MMOL/L
PROT SERPL-MCNC: 7.1 G/DL
SODIUM SERPL-SCNC: 140 MMOL/L
TRIGL SERPL-MCNC: 285 MG/DL
TSH SERPL-ACNC: 2.7 UIU/ML

## 2023-04-11 ENCOUNTER — TRANSCRIPTION ENCOUNTER (OUTPATIENT)
Age: 53
End: 2023-04-11

## 2023-04-12 ENCOUNTER — APPOINTMENT (OUTPATIENT)
Dept: ENDOCRINOLOGY | Facility: CLINIC | Age: 53
End: 2023-04-12
Payer: COMMERCIAL

## 2023-04-12 DIAGNOSIS — E11.69 TYPE 2 DIABETES MELLITUS WITH OTHER SPECIFIED COMPLICATION: ICD-10-CM

## 2023-04-12 DIAGNOSIS — Z79.4 TYPE 2 DIABETES MELLITUS WITH OTHER SPECIFIED COMPLICATION: ICD-10-CM

## 2023-04-12 PROCEDURE — G0108 DIAB MANAGE TRN  PER INDIV: CPT

## 2023-04-12 PROCEDURE — 95251 CONT GLUC MNTR ANALYSIS I&R: CPT

## 2023-04-26 ENCOUNTER — APPOINTMENT (OUTPATIENT)
Dept: ENDOCRINOLOGY | Facility: CLINIC | Age: 53
End: 2023-04-26
Payer: COMMERCIAL

## 2023-04-26 PROCEDURE — P0004: CPT

## 2023-05-08 ENCOUNTER — RX RENEWAL (OUTPATIENT)
Age: 53
End: 2023-05-08

## 2023-05-10 ENCOUNTER — NON-APPOINTMENT (OUTPATIENT)
Age: 53
End: 2023-05-10

## 2023-06-07 ENCOUNTER — RX RENEWAL (OUTPATIENT)
Age: 53
End: 2023-06-07

## 2023-06-23 ENCOUNTER — APPOINTMENT (OUTPATIENT)
Dept: ENDOCRINOLOGY | Facility: CLINIC | Age: 53
End: 2023-06-23
Payer: COMMERCIAL

## 2023-06-23 ENCOUNTER — APPOINTMENT (OUTPATIENT)
Dept: ENDOCRINOLOGY | Facility: CLINIC | Age: 53
End: 2023-06-23

## 2023-06-23 VITALS
TEMPERATURE: 97.5 F | DIASTOLIC BLOOD PRESSURE: 80 MMHG | OXYGEN SATURATION: 98 % | WEIGHT: 257 LBS | SYSTOLIC BLOOD PRESSURE: 138 MMHG | HEIGHT: 73 IN | HEART RATE: 91 BPM | BODY MASS INDEX: 34.06 KG/M2

## 2023-06-23 PROCEDURE — 82962 GLUCOSE BLOOD TEST: CPT

## 2023-06-23 PROCEDURE — 95251 CONT GLUC MNTR ANALYSIS I&R: CPT

## 2023-06-23 PROCEDURE — 99214 OFFICE O/P EST MOD 30 MIN: CPT | Mod: 25

## 2023-06-26 LAB — GLUCOSE BLDC GLUCOMTR-MCNC: 166

## 2023-06-26 NOTE — ASSESSMENT
[FreeTextEntry1] : #DM2 with high insulin resistance\par -Patient is now using U-500 glucose OmniPod and his blood sugar readings have been significantly improved.\par -I reviewed and analyzed his CGM data.  Patient's time in range is 77%, high 18%, very high 2%, low 3%, very low 0%.  His average blood glucose is 146.  He is 100% of the time in the automated mode\par Plan:\par -Continue with current regimen of U-500 via his insulin pump\par -He is complaining of some increased swelling in his albuminuria has been rising.  Recommend patient to see nephrology\par -Continue with metformin 1000 mg twice a day and Trulicity 3 mg weekly\par -ophthalmology referral\par -podiatry referral\par \par #HTN\par -c/w losartan 50mg daily\par -Blood pressure is stable\par \par #HLD\par -Continue with atorvastatin 40 mg daily\par \par

## 2023-06-26 NOTE — HISTORY OF PRESENT ILLNESS
[FreeTextEntry1] : Primary diagnosis: Type 2 DM with severe insulin resistance, hyperlipidemia, hypertension\par \par Since last visit, patient has been using U-500 and his insulin pump via OmniPod.  He does mention some increased swelling in his legs\par \par Diagnosis: 15 years ago \par Last HbA1c 11.4% on 11/2/22-->7.7% today-->6.6% today \par Current regimen: U-500 via insulin pump + metformin 1000 mg twice a day + Trulicity 3 mg weekly on Saturdays\par Fasting- 70s- 90s\par Before dinner- 110-120\par Hypoglycemia -not frequent\par \par Basal insulin pump settings:\par 12 AM to 12 PM: 0.5 units an hour\par \par Patient is due for eye exam.\par Denies any issues with the feet\par \par losartan 50mg daily\par Atorvastatin 20mg daily\par \par Last lipid panel from 11/3/22 showed:\par Total Cholesterol 198\par \par HDL 40\par LDL 84\par \par Urine albumin/creatinine 344 on 3/29/22. Has been rising since microalbuminuria present in 7/2017

## 2023-06-26 NOTE — PHYSICAL EXAM
[Alert] : alert [Well Nourished] : well nourished [Healthy Appearance] : healthy appearance [No Acute Distress] : no acute distress [Well Developed] : well developed [Normal Voice/Communication] : normal voice communication [Normal Sclera/Conjunctiva] : normal sclera/conjunctiva [No Neck Mass] : no neck mass was observed [No LAD] : no lymphadenopathy [Thyroid Not Enlarged] : the thyroid was not enlarged [No Thyroid Nodules] : no palpable thyroid nodules [No Respiratory Distress] : no respiratory distress [No Accessory Muscle Use] : no accessory muscle use [Normal Rate and Effort] : normal respiratory rate and effort [No Murmurs] : no murmurs [Normal Rate] : heart rate was normal [Regular Rhythm] : with a regular rhythm [No Edema] : no peripheral edema [No Stigmata of Cushings Syndrome] : no stigmata of Cushings Syndrome [Normal Sensation on Monofilament Testing] : normal sensation on monofilament testing of lower extremities [Oriented x3] : oriented to person, place, and time [Normal Insight/Judgement] : insight and judgment were intact [Abdominal Striae] : no abdominal striae [Acanthosis Nigricans] : no acanthosis nigricans [de-identified] : there is a small laceration along the lateral aspect of the right first toe with crusted blood. No purulence or signs of infection

## 2023-07-07 ENCOUNTER — RX RENEWAL (OUTPATIENT)
Age: 53
End: 2023-07-07

## 2023-08-01 ENCOUNTER — RX RENEWAL (OUTPATIENT)
Age: 53
End: 2023-08-01

## 2023-08-28 ENCOUNTER — RX RENEWAL (OUTPATIENT)
Age: 53
End: 2023-08-28

## 2023-10-05 ENCOUNTER — RX RENEWAL (OUTPATIENT)
Age: 53
End: 2023-10-05

## 2023-10-05 RX ORDER — ATORVASTATIN CALCIUM 40 MG/1
40 TABLET, FILM COATED ORAL
Qty: 90 | Refills: 3 | Status: ACTIVE | COMMUNITY
Start: 2023-01-09 | End: 1900-01-01

## 2023-10-10 RX ORDER — INSULIN PMP CART,AUT,G6/7,CNTR
EACH SUBCUTANEOUS
Qty: 1 | Refills: 0 | Status: DISCONTINUED | COMMUNITY
Start: 2023-01-09 | End: 2023-10-10

## 2023-10-11 ENCOUNTER — APPOINTMENT (OUTPATIENT)
Dept: FAMILY MEDICINE | Facility: CLINIC | Age: 53
End: 2023-10-11
Payer: COMMERCIAL

## 2023-10-11 ENCOUNTER — NON-APPOINTMENT (OUTPATIENT)
Age: 53
End: 2023-10-11

## 2023-10-11 VITALS
WEIGHT: 267 LBS | OXYGEN SATURATION: 98 % | DIASTOLIC BLOOD PRESSURE: 80 MMHG | TEMPERATURE: 98 F | HEART RATE: 85 BPM | HEIGHT: 73 IN | BODY MASS INDEX: 35.39 KG/M2 | SYSTOLIC BLOOD PRESSURE: 136 MMHG

## 2023-10-11 DIAGNOSIS — Z00.00 ENCOUNTER FOR GENERAL ADULT MEDICAL EXAMINATION W/OUT ABNORMAL FINDINGS: ICD-10-CM

## 2023-10-11 DIAGNOSIS — R21 RASH AND OTHER NONSPECIFIC SKIN ERUPTION: ICD-10-CM

## 2023-10-11 PROCEDURE — 99396 PREV VISIT EST AGE 40-64: CPT | Mod: 25

## 2023-10-12 LAB
ALBUMIN SERPL ELPH-MCNC: 4.4 G/DL
ALP BLD-CCNC: 95 U/L
ALT SERPL-CCNC: 29 U/L
ANION GAP SERPL CALC-SCNC: 14 MMOL/L
AST SERPL-CCNC: 26 U/L
BASOPHILS # BLD AUTO: 0.06 K/UL
BASOPHILS NFR BLD AUTO: 0.6 %
BILIRUB SERPL-MCNC: 0.6 MG/DL
BUN SERPL-MCNC: 14 MG/DL
CALCIUM SERPL-MCNC: 9.4 MG/DL
CHLORIDE SERPL-SCNC: 106 MMOL/L
CHOLEST SERPL-MCNC: 167 MG/DL
CO2 SERPL-SCNC: 21 MMOL/L
CREAT SERPL-MCNC: 0.92 MG/DL
EGFR: 101 ML/MIN/1.73M2
EOSINOPHIL # BLD AUTO: 0.41 K/UL
EOSINOPHIL NFR BLD AUTO: 4.4 %
ESTIMATED AVERAGE GLUCOSE: 140 MG/DL
GLUCOSE SERPL-MCNC: 128 MG/DL
HBA1C MFR BLD HPLC: 6.5 %
HCT VFR BLD CALC: 49.8 %
HDLC SERPL-MCNC: 39 MG/DL
HGB BLD-MCNC: 15.7 G/DL
IMM GRANULOCYTES NFR BLD AUTO: 1.2 %
LDLC SERPL CALC-MCNC: 90 MG/DL
LYMPHOCYTES # BLD AUTO: 1.63 K/UL
LYMPHOCYTES NFR BLD AUTO: 17.3 %
MAN DIFF?: NORMAL
MCHC RBC-ENTMCNC: 30.8 PG
MCHC RBC-ENTMCNC: 31.5 GM/DL
MCV RBC AUTO: 97.8 FL
MONOCYTES # BLD AUTO: 0.6 K/UL
MONOCYTES NFR BLD AUTO: 6.4 %
NEUTROPHILS # BLD AUTO: 6.59 K/UL
NEUTROPHILS NFR BLD AUTO: 70.1 %
NONHDLC SERPL-MCNC: 127 MG/DL
PLATELET # BLD AUTO: 220 K/UL
POTASSIUM SERPL-SCNC: 4.5 MMOL/L
PROT SERPL-MCNC: 7 G/DL
PSA SERPL-MCNC: 0.41 NG/ML
RBC # BLD: 5.09 M/UL
RBC # FLD: 13.9 %
SODIUM SERPL-SCNC: 141 MMOL/L
TRIGL SERPL-MCNC: 222 MG/DL
TSH SERPL-ACNC: 3.21 UIU/ML
WBC # FLD AUTO: 9.4 K/UL

## 2023-11-13 ENCOUNTER — RX RENEWAL (OUTPATIENT)
Age: 53
End: 2023-11-13

## 2023-12-11 ENCOUNTER — APPOINTMENT (OUTPATIENT)
Dept: ENDOCRINOLOGY | Facility: CLINIC | Age: 53
End: 2023-12-11

## 2024-01-02 ENCOUNTER — RX RENEWAL (OUTPATIENT)
Age: 54
End: 2024-01-02

## 2024-01-02 RX ORDER — DULAGLUTIDE 3 MG/.5ML
3 INJECTION, SOLUTION SUBCUTANEOUS
Qty: 4 | Refills: 3 | Status: ACTIVE | COMMUNITY
Start: 2018-11-08 | End: 1900-01-01

## 2024-01-11 ENCOUNTER — RX RENEWAL (OUTPATIENT)
Age: 54
End: 2024-01-11

## 2024-01-19 ENCOUNTER — APPOINTMENT (OUTPATIENT)
Dept: ENDOCRINOLOGY | Facility: CLINIC | Age: 54
End: 2024-01-19
Payer: COMMERCIAL

## 2024-01-19 VITALS
OXYGEN SATURATION: 97 % | HEART RATE: 89 BPM | TEMPERATURE: 98.4 F | WEIGHT: 260 LBS | HEIGHT: 73 IN | DIASTOLIC BLOOD PRESSURE: 82 MMHG | SYSTOLIC BLOOD PRESSURE: 157 MMHG | BODY MASS INDEX: 34.46 KG/M2

## 2024-01-19 DIAGNOSIS — E78.2 MIXED HYPERLIPIDEMIA: ICD-10-CM

## 2024-01-19 DIAGNOSIS — E11.9 TYPE 2 DIABETES MELLITUS W/OUT COMPLICATIONS: ICD-10-CM

## 2024-01-19 LAB
GLUCOSE BLDC GLUCOMTR-MCNC: 142
HBA1C MFR BLD HPLC: 7.4

## 2024-01-19 PROCEDURE — 99215 OFFICE O/P EST HI 40 MIN: CPT

## 2024-01-19 PROCEDURE — 83036 HEMOGLOBIN GLYCOSYLATED A1C: CPT | Mod: QW

## 2024-01-19 PROCEDURE — 82962 GLUCOSE BLOOD TEST: CPT

## 2024-01-19 PROCEDURE — 95251 CONT GLUC MNTR ANALYSIS I&R: CPT

## 2024-01-19 PROCEDURE — G2211 COMPLEX E/M VISIT ADD ON: CPT

## 2024-01-19 RX ORDER — INSULIN HUMAN 500 [IU]/ML
500 INJECTION, SOLUTION SUBCUTANEOUS
Qty: 5 | Refills: 6 | Status: ACTIVE | COMMUNITY
Start: 2023-01-09 | End: 1900-01-01

## 2024-01-19 RX ORDER — INSULIN PMP CART,AUT,G6/7,CNTR
EACH SUBCUTANEOUS
Qty: 6 | Refills: 3 | Status: ACTIVE | COMMUNITY
Start: 2023-01-09 | End: 1900-01-01

## 2024-01-19 RX ORDER — BLOOD-GLUCOSE SENSOR
EACH MISCELLANEOUS
Qty: 3 | Refills: 3 | Status: ACTIVE | COMMUNITY
Start: 2023-03-08 | End: 2025-01-13

## 2024-01-22 NOTE — PHYSICAL EXAM
[Alert] : alert [Well Nourished] : well nourished [Healthy Appearance] : healthy appearance [No Acute Distress] : no acute distress [Well Developed] : well developed [Normal Voice/Communication] : normal voice communication [Normal Sclera/Conjunctiva] : normal sclera/conjunctiva [No Neck Mass] : no neck mass was observed [No LAD] : no lymphadenopathy [Thyroid Not Enlarged] : the thyroid was not enlarged [No Thyroid Nodules] : no palpable thyroid nodules [No Respiratory Distress] : no respiratory distress [No Accessory Muscle Use] : no accessory muscle use [Normal Rate and Effort] : normal respiratory rate and effort [No Murmurs] : no murmurs [Normal Rate] : heart rate was normal [Regular Rhythm] : with a regular rhythm [No Edema] : no peripheral edema [No Stigmata of Cushings Syndrome] : no stigmata of Cushings Syndrome [Normal Sensation on Monofilament Testing] : normal sensation on monofilament testing of lower extremities [Oriented x3] : oriented to person, place, and time [Normal Insight/Judgement] : insight and judgment were intact [Acanthosis Nigricans] : no acanthosis nigricans [Abdominal Striae] : no abdominal striae [de-identified] : there is a small laceration along the lateral aspect of the right first toe with crusted blood. No purulence or signs of infection

## 2024-01-22 NOTE — HISTORY OF PRESENT ILLNESS
[FreeTextEntry1] : Primary diagnosis: Type 2 DM with severe insulin resistance, hyperlipidemia, hypertension  Since last visit, patient has been using U-500 and his insulin pump via OmniPod.  He does mention some increased swelling in his legs  Diagnosis: 15 years ago  Last HbA1c 11.4% on 11/2/22-->7.7% today-->6.6%-->7.4% today  Current regimen: U-500 via insulin pump + metformin 1000 mg twice a day + Trulicity 3 mg weekly on Saturdays Patient is having postprandial spikes  Basal insulin pump settings: 12 AM to 12 PM: 0.5 units an hour Insulin sensitivity factor is 1: 50 Insulin to carb ratio is 1:14.5 Active insulin time is 4 hours Blood glucose target ranges 110  Patient is due for eye exam. Denies any issues with the feet  losartan 50mg daily Atorvastatin 20mg daily  Last lipid panel from 11/3/22 showed: Total Cholesterol 198  HDL 40 LDL 84  Urine albumin/creatinine 344 on 3/29/22. Has been rising since microalbuminuria present in 7/2017

## 2024-01-22 NOTE — ASSESSMENT
[FreeTextEntry1] :  #DM2 with high insulin resistance -Patient is now using U-500 glucose OmniPod and his blood sugar readings have been significantly improved. -I reviewed and analyzed his CGM data.  Patient's time in range is 38%, high 26%, very high 35%, low 1%, very low 0%.  His average blood glucose is 212 with a standard deviation of 74.  Blood sugars are mostly running elevated after meals with consistent postprandial elevations -patient is having a difficult time getting Trulicity Plan: -Will tighten the carb ratio from 1:14.5 to 1:13.5 -Again, advised patient to see nephrology especially in the setting of microalbuminuria -Continue with metformin 1000 mg twice a day  -Due to patient having a difficult time getting Trulicity, we will switch patient over to Ozempic 1 mg weekly -ophthalmology referral -podiatry referral  Patient counseled extensively about the complications of diabetes including but not limited to nephropathy, neuropathy, and retinopathy. We discussed the importance of annual foot and optho exams. Explained that ideally blood sugars in the morning prior to breakfast should be between 80 and 130. Blood sugars should be checked 2 hours after eating and should be <180. If blood sugar is <70, patient should treat the blood sugar FIRST and then contact provider. Advised patient to let us know if BG persistently <70 or >250  #HTN -c/w losartan 50mg daily -Blood pressure is stable    #HLD -Continue with atorvastatin 40 mg daily

## 2024-01-24 LAB
CREAT SPEC-SCNC: 397 MG/DL
MICROALBUMIN 24H UR DL<=1MG/L-MCNC: 380.3 MG/DL
MICROALBUMIN/CREAT 24H UR-RTO: 959 MG/G

## 2024-01-25 RX ORDER — SEMAGLUTIDE 1.34 MG/ML
2 INJECTION, SOLUTION SUBCUTANEOUS
Qty: 1.5 | Refills: 0 | Status: ACTIVE | COMMUNITY
Start: 2022-12-27

## 2024-01-25 RX ORDER — SEMAGLUTIDE 1.34 MG/ML
4 INJECTION, SOLUTION SUBCUTANEOUS
Qty: 2 | Refills: 6 | Status: ACTIVE | COMMUNITY
Start: 2024-01-19

## 2024-01-31 ENCOUNTER — TRANSCRIPTION ENCOUNTER (OUTPATIENT)
Age: 54
End: 2024-01-31

## 2024-02-01 ENCOUNTER — NON-APPOINTMENT (OUTPATIENT)
Age: 54
End: 2024-02-01

## 2024-03-18 ENCOUNTER — RX RENEWAL (OUTPATIENT)
Age: 54
End: 2024-03-18

## 2024-03-18 RX ORDER — BLOOD-GLUCOSE TRANSMITTER
EACH MISCELLANEOUS
Qty: 3 | Refills: 3 | Status: ACTIVE | COMMUNITY
Start: 2023-03-08 | End: 1900-01-01

## 2024-03-20 ENCOUNTER — APPOINTMENT (OUTPATIENT)
Dept: NEPHROLOGY | Facility: CLINIC | Age: 54
End: 2024-03-20
Payer: COMMERCIAL

## 2024-03-20 VITALS
RESPIRATION RATE: 16 BRPM | WEIGHT: 260 LBS | TEMPERATURE: 97.4 F | OXYGEN SATURATION: 98 % | DIASTOLIC BLOOD PRESSURE: 80 MMHG | BODY MASS INDEX: 34.46 KG/M2 | HEART RATE: 98 BPM | SYSTOLIC BLOOD PRESSURE: 150 MMHG | HEIGHT: 73 IN

## 2024-03-20 PROCEDURE — 99204 OFFICE O/P NEW MOD 45 MIN: CPT

## 2024-03-20 PROCEDURE — G2211 COMPLEX E/M VISIT ADD ON: CPT

## 2024-03-20 NOTE — PHYSICAL EXAM
[General Appearance - Alert] : alert [General Appearance - In No Acute Distress] : in no acute distress [General Appearance - Well Nourished] : well nourished [General Appearance - Well Developed] : well developed [General Appearance - Well-Appearing] : healthy appearing [Sclera] : the sclera and conjunctiva were normal [PERRL With Normal Accommodation] : pupils were equal in size, round, and reactive to light [Extraocular Movements] : extraocular movements were intact [Outer Ear] : the ears and nose were normal in appearance [Neck Appearance] : the appearance of the neck was normal [Jugular Venous Distention Increased] : there was no jugular-venous distention [] : no respiratory distress [Exaggerated Use Of Accessory Muscles For Inspiration] : no accessory muscle use [Respiration, Rhythm And Depth] : normal respiratory rhythm and effort [Auscultation Breath Sounds / Voice Sounds] : lungs were clear to auscultation bilaterally [Heart Sounds] : normal S1 and S2 [Arterial Pulses Carotid] : carotid pulses were normal with no bruits [Edema] : there was no peripheral edema [Bowel Sounds] : normal bowel sounds [Abdomen Soft] : soft [Abdomen Tenderness] : non-tender [No CVA Tenderness] : no ~M costovertebral angle tenderness [Abnormal Walk] : normal gait [FreeTextEntry1] : rash under right armpit and in skin creases on chest wall.  [Motor Exam] : the motor exam was normal [Oriented To Time, Place, And Person] : oriented to person, place, and time [Impaired Insight] : insight and judgment were intact [Affect] : the affect was normal [Mood] : the mood was normal

## 2024-03-20 NOTE — ASSESSMENT
[FreeTextEntry1] : 1. Proteinuria - increasing albuminuria, likely due to diabetes given duration of disease and relatively poor BS control.  Will initiate the evaluation with renal ultrasound, repeat of urine protein studies and review of renal function.  Will increase Losartan to 100 mg per day.  Discussed dietary protein moderation which was also to include reduction of salt intake.  Further evaluation per course.  2. Hypertension - recommended low salt diet, increase of exercise, weight loss, increasing Losartan and obtaining a home blood pressure monitor.   3. Vitamin D deficiency - will reassess vitamin D 25 levels. Discussed with patient.  Questions answered. Follow up 8 weeks.  Further recommendations per his clinical course.

## 2024-03-20 NOTE — HISTORY OF PRESENT ILLNESS
[FreeTextEntry1] : Patient with history of DM, hyperlipidemia, hypertension, presenting with progressive microalbuminuria.  He notes DM for about 15 years with A1C upwards of 9-13 at times.  Denied known retinopathy but unclear based upon medical records.  Denied neuropathy.  He also denies history of UTI, kidney stones or use of NSAIDs.  Does not follow dietary salt restriction.  He claims compliance with medications.  Urine is foamy at times and has noted periodic lower extremity edema.  Blood pressure also reported to be climbing recently with systolic readings 140-150 though he does not check at home.  Creatinine/eGFR have been normal recently.  Albuminuria has been progressively increasing for several years.  He notes skin rash under right arm pit for which he now uses hydrocortisone which helps modestly.

## 2024-04-09 ENCOUNTER — RX RENEWAL (OUTPATIENT)
Age: 54
End: 2024-04-09

## 2024-05-03 ENCOUNTER — RX RENEWAL (OUTPATIENT)
Age: 54
End: 2024-05-03

## 2024-05-03 RX ORDER — INSULIN HUMAN 500 [IU]/ML
500 INJECTION, SOLUTION SUBCUTANEOUS
Qty: 5 | Refills: 2 | Status: ACTIVE | COMMUNITY
Start: 2023-04-12 | End: 1900-01-01

## 2024-05-12 LAB
25(OH)D3 SERPL-MCNC: 23.7 NG/ML
ALBUMIN SERPL ELPH-MCNC: 4.4 G/DL
ANION GAP SERPL CALC-SCNC: 16 MMOL/L
APPEARANCE: CLEAR
BACTERIA: NEGATIVE /HPF
BILIRUBIN URINE: NEGATIVE
BLOOD URINE: ABNORMAL
BUN SERPL-MCNC: 19 MG/DL
CALCIUM SERPL-MCNC: 9.6 MG/DL
CAST: 1 /LPF
CHLORIDE SERPL-SCNC: 105 MMOL/L
CO2 SERPL-SCNC: 22 MMOL/L
COLOR: YELLOW
CREAT SERPL-MCNC: 1.01 MG/DL
CREAT SPEC-SCNC: 109 MG/DL
CREAT SPEC-SCNC: 109 MG/DL
CREAT/PROT UR: 0.8 RATIO
EGFR: 90 ML/MIN/1.73M2
EPITHELIAL CELLS: 0 /HPF
GLUCOSE QUALITATIVE U: 100 MG/DL
GLUCOSE SERPL-MCNC: 114 MG/DL
KETONES URINE: NEGATIVE MG/DL
LEUKOCYTE ESTERASE URINE: NEGATIVE
MICROALBUMIN 24H UR DL<=1MG/L-MCNC: 51.3 MG/DL
MICROALBUMIN/CREAT 24H UR-RTO: 473 MG/G
MICROSCOPIC-UA: NORMAL
NITRITE URINE: NEGATIVE
PH URINE: 6
PHOSPHATE SERPL-MCNC: 3.4 MG/DL
POTASSIUM SERPL-SCNC: 4.4 MMOL/L
PROT UR-MCNC: 84 MG/DL
PROTEIN URINE: 100 MG/DL
RED BLOOD CELLS URINE: 2 /HPF
SODIUM SERPL-SCNC: 143 MMOL/L
SPECIFIC GRAVITY URINE: 1.02
UROBILINOGEN URINE: 0.2 MG/DL
WHITE BLOOD CELLS URINE: 0 /HPF

## 2024-05-13 LAB
ALBUPE: 70.2 %
ALPHA1UPE: 8.3 %
ALPHA2UPE: 5.3 %
BETAUPE: 8.2 %
GAMMAUPE: 8 %
IGA 24H UR QL IFE: NORMAL
KAPPA LC 24H UR QL: NORMAL
PROT PATTERN 24H UR ELPH-IMP: NORMAL
PROT UR-MCNC: 90 MG/DL
PROT UR-MCNC: 90 MG/DL

## 2024-05-15 ENCOUNTER — APPOINTMENT (OUTPATIENT)
Dept: NEPHROLOGY | Facility: CLINIC | Age: 54
End: 2024-05-15
Payer: COMMERCIAL

## 2024-05-15 VITALS
OXYGEN SATURATION: 98 % | SYSTOLIC BLOOD PRESSURE: 120 MMHG | HEART RATE: 106 BPM | HEIGHT: 73 IN | DIASTOLIC BLOOD PRESSURE: 80 MMHG | BODY MASS INDEX: 34.46 KG/M2 | WEIGHT: 260 LBS

## 2024-05-15 DIAGNOSIS — R80.9 PROTEINURIA, UNSPECIFIED: ICD-10-CM

## 2024-05-15 DIAGNOSIS — I10 ESSENTIAL (PRIMARY) HYPERTENSION: ICD-10-CM

## 2024-05-15 DIAGNOSIS — E55.9 VITAMIN D DEFICIENCY, UNSPECIFIED: ICD-10-CM

## 2024-05-15 PROCEDURE — 99214 OFFICE O/P EST MOD 30 MIN: CPT

## 2024-05-15 RX ORDER — LOSARTAN POTASSIUM 50 MG/1
50 TABLET, FILM COATED ORAL
Qty: 90 | Refills: 3 | Status: DISCONTINUED | COMMUNITY
Start: 2024-04-09 | End: 2024-05-15

## 2024-05-15 RX ORDER — UBIDECARENONE/VIT E ACET 100MG-5
25 MCG CAPSULE ORAL DAILY
Refills: 0 | Status: ACTIVE | COMMUNITY
Start: 2024-05-15

## 2024-05-16 NOTE — ASSESSMENT
[FreeTextEntry1] : 1. Proteinuria - Likely due to diabetes given duration of disease and relatively poor BS control.  Losartan previously increased to 100 mg per day.  Urine albumin has decreased.  Discussed dietary protein moderation which was also to include reduction of salt intake.  Can consider addition of Farxiga, per Endocrine, but currently with improving levels of albuminuria on previously increased ARB.  Further evaluation recommendations will be per clinical course.  Renal ultrasound reordered as patient did not complete request.  2. Hypertension - previously recommended low salt diet, increase of exercise, weight loss, increasing Losartan and obtaining a home blood pressure monitor.  Current blood pressure stable as noted above.  3. Vitamin D deficiency - recommended 1000 iu per day with repeat testing prior to next visit.   Discussed with patient.  Questions answered.  Follow up August 2024.

## 2024-05-16 NOTE — PHYSICAL EXAM
[General Appearance - Alert] : alert [General Appearance - In No Acute Distress] : in no acute distress [General Appearance - Well Nourished] : well nourished [General Appearance - Well Developed] : well developed [General Appearance - Well-Appearing] : healthy appearing [Sclera] : the sclera and conjunctiva were normal [PERRL With Normal Accommodation] : pupils were equal in size, round, and reactive to light [Extraocular Movements] : extraocular movements were intact [Outer Ear] : the ears and nose were normal in appearance [Neck Appearance] : the appearance of the neck was normal [Jugular Venous Distention Increased] : there was no jugular-venous distention [Respiration, Rhythm And Depth] : normal respiratory rhythm and effort [] : no respiratory distress [Exaggerated Use Of Accessory Muscles For Inspiration] : no accessory muscle use [Auscultation Breath Sounds / Voice Sounds] : lungs were clear to auscultation bilaterally [Heart Sounds] : normal S1 and S2 [Arterial Pulses Carotid] : carotid pulses were normal with no bruits [Edema] : there was no peripheral edema [Bowel Sounds] : normal bowel sounds [Abdomen Soft] : soft [Abdomen Tenderness] : non-tender [No CVA Tenderness] : no ~M costovertebral angle tenderness [Abnormal Walk] : normal gait [Motor Exam] : the motor exam was normal [Oriented To Time, Place, And Person] : oriented to person, place, and time [Impaired Insight] : insight and judgment were intact [Affect] : the affect was normal [Mood] : the mood was normal [FreeTextEntry1] : rash under right armpit and in skin creases on chest wall.

## 2024-05-16 NOTE — HISTORY OF PRESENT ILLNESS
[FreeTextEntry1] : Patient with history of DM, hyperlipidemia, hypertension, presenting with progressive microalbuminuria.  He notes DM for about 15 years with A1C upwards of 9-13 at times.  Denied known retinopathy but unclear based upon medical records.  Denied neuropathy.  He also denies history of UTI, kidney stones or use of NSAIDs.  Does not follow dietary salt restriction.  He claims compliance with medications.  Urine is foamy at times and has noted periodic lower extremity edema.  Creatinine/eGFR have been normal.  Albuminuria has been progressively increasing for several years.  He notes skin rash under right arm pit for which he now uses hydrocortisone which helps modestly.

## 2024-07-15 ENCOUNTER — APPOINTMENT (OUTPATIENT)
Dept: ENDOCRINOLOGY | Facility: CLINIC | Age: 54
End: 2024-07-15
Payer: COMMERCIAL

## 2024-07-15 VITALS
WEIGHT: 250 LBS | BODY MASS INDEX: 33.13 KG/M2 | DIASTOLIC BLOOD PRESSURE: 84 MMHG | HEIGHT: 73 IN | OXYGEN SATURATION: 98 % | SYSTOLIC BLOOD PRESSURE: 152 MMHG | HEART RATE: 76 BPM

## 2024-07-15 DIAGNOSIS — E11.9 TYPE 2 DIABETES MELLITUS W/OUT COMPLICATIONS: ICD-10-CM

## 2024-07-15 DIAGNOSIS — I10 ESSENTIAL (PRIMARY) HYPERTENSION: ICD-10-CM

## 2024-07-15 DIAGNOSIS — E78.2 MIXED HYPERLIPIDEMIA: ICD-10-CM

## 2024-07-15 LAB
GLUCOSE BLDC GLUCOMTR-MCNC: 124
HBA1C MFR BLD HPLC: 6.3

## 2024-07-15 PROCEDURE — 95251 CONT GLUC MNTR ANALYSIS I&R: CPT

## 2024-07-15 PROCEDURE — 99214 OFFICE O/P EST MOD 30 MIN: CPT

## 2024-07-15 PROCEDURE — 83036 HEMOGLOBIN GLYCOSYLATED A1C: CPT | Mod: QW

## 2024-07-15 PROCEDURE — 82962 GLUCOSE BLOOD TEST: CPT

## 2024-07-16 ENCOUNTER — NON-APPOINTMENT (OUTPATIENT)
Age: 54
End: 2024-07-16

## 2024-07-16 LAB
ALBUMIN SERPL ELPH-MCNC: 4.4 G/DL
ALP BLD-CCNC: 83 U/L
ALT SERPL-CCNC: 39 U/L
ANION GAP SERPL CALC-SCNC: 16 MMOL/L
AST SERPL-CCNC: 33 U/L
BILIRUB SERPL-MCNC: 0.8 MG/DL
BUN SERPL-MCNC: 17 MG/DL
CALCIUM SERPL-MCNC: 9.7 MG/DL
CHLORIDE SERPL-SCNC: 105 MMOL/L
CHOLEST SERPL-MCNC: 142 MG/DL
CO2 SERPL-SCNC: 22 MMOL/L
CREAT SERPL-MCNC: 0.98 MG/DL
EGFR: 93 ML/MIN/1.73M2
GLUCOSE SERPL-MCNC: 119 MG/DL
HDLC SERPL-MCNC: 43 MG/DL
LDLC SERPL CALC-MCNC: 79 MG/DL
NONHDLC SERPL-MCNC: 99 MG/DL
POTASSIUM SERPL-SCNC: 4.1 MMOL/L
PROT SERPL-MCNC: 7.3 G/DL
SODIUM SERPL-SCNC: 143 MMOL/L
TRIGL SERPL-MCNC: 110 MG/DL
TSH SERPL-ACNC: 3.22 UIU/ML

## 2024-08-07 ENCOUNTER — APPOINTMENT (OUTPATIENT)
Dept: NEPHROLOGY | Facility: CLINIC | Age: 54
End: 2024-08-07

## 2024-10-02 ENCOUNTER — RX RENEWAL (OUTPATIENT)
Age: 54
End: 2024-10-02

## 2024-12-20 ENCOUNTER — APPOINTMENT (OUTPATIENT)
Dept: ENDOCRINOLOGY | Facility: CLINIC | Age: 54
End: 2024-12-20
Payer: COMMERCIAL

## 2024-12-20 ENCOUNTER — LABORATORY RESULT (OUTPATIENT)
Age: 54
End: 2024-12-20

## 2024-12-20 VITALS
WEIGHT: 250 LBS | HEIGHT: 73 IN | SYSTOLIC BLOOD PRESSURE: 135 MMHG | OXYGEN SATURATION: 98 % | HEART RATE: 94 BPM | BODY MASS INDEX: 33.13 KG/M2 | TEMPERATURE: 98.6 F | DIASTOLIC BLOOD PRESSURE: 77 MMHG

## 2024-12-20 DIAGNOSIS — E11.9 TYPE 2 DIABETES MELLITUS W/OUT COMPLICATIONS: ICD-10-CM

## 2024-12-20 DIAGNOSIS — E78.2 MIXED HYPERLIPIDEMIA: ICD-10-CM

## 2024-12-20 DIAGNOSIS — I10 ESSENTIAL (PRIMARY) HYPERTENSION: ICD-10-CM

## 2024-12-20 LAB
GLUCOSE BLDC GLUCOMTR-MCNC: 103
HBA1C MFR BLD HPLC: 6.4

## 2024-12-20 PROCEDURE — 83036 HEMOGLOBIN GLYCOSYLATED A1C: CPT | Mod: QW

## 2024-12-20 PROCEDURE — 99214 OFFICE O/P EST MOD 30 MIN: CPT

## 2024-12-20 PROCEDURE — 82962 GLUCOSE BLOOD TEST: CPT

## 2024-12-20 RX ORDER — NYSTATIN AND TRIAMCINOLONE ACETONIDE 100000; 1 MG/G; MG/G
100000-0.1 CREAM TOPICAL 3 TIMES DAILY
Qty: 1 | Refills: 0 | Status: ACTIVE | COMMUNITY
Start: 2024-12-20 | End: 1900-01-01

## 2024-12-20 RX ORDER — SEMAGLUTIDE 2.68 MG/ML
8 INJECTION, SOLUTION SUBCUTANEOUS
Qty: 2 | Refills: 3 | Status: ACTIVE | COMMUNITY
Start: 2024-12-20 | End: 1900-01-01

## 2024-12-23 ENCOUNTER — NON-APPOINTMENT (OUTPATIENT)
Age: 54
End: 2024-12-23

## 2024-12-23 LAB
ALBUMIN SERPL ELPH-MCNC: 4.1 G/DL
ALP BLD-CCNC: 113 U/L
ALT SERPL-CCNC: 29 U/L
ANION GAP SERPL CALC-SCNC: 14 MMOL/L
AST SERPL-CCNC: 23 U/L
BILIRUB SERPL-MCNC: 0.2 MG/DL
BUN SERPL-MCNC: 21 MG/DL
CALCIUM SERPL-MCNC: 9.6 MG/DL
CHLORIDE SERPL-SCNC: 108 MMOL/L
CHOLEST SERPL-MCNC: 152 MG/DL
CO2 SERPL-SCNC: 23 MMOL/L
CREAT SERPL-MCNC: 1.33 MG/DL
CREAT SPEC-SCNC: 355 MG/DL
EGFR: 64 ML/MIN/1.73M2
GLUCOSE SERPL-MCNC: 115 MG/DL
HDLC SERPL-MCNC: 35 MG/DL
LDLC SERPL CALC-MCNC: 41 MG/DL
MICROALBUMIN 24H UR DL<=1MG/L-MCNC: 90.8 MG/DL
MICROALBUMIN/CREAT 24H UR-RTO: 256 MG/G
NONHDLC SERPL-MCNC: 117 MG/DL
POTASSIUM SERPL-SCNC: 4.1 MMOL/L
PROT SERPL-MCNC: 7 G/DL
SODIUM SERPL-SCNC: 145 MMOL/L
TRIGL SERPL-MCNC: 536 MG/DL
TSH SERPL-ACNC: 5.26 UIU/ML

## 2025-01-02 ENCOUNTER — TRANSCRIPTION ENCOUNTER (OUTPATIENT)
Age: 55
End: 2025-01-02

## 2025-01-21 ENCOUNTER — TRANSCRIPTION ENCOUNTER (OUTPATIENT)
Age: 55
End: 2025-01-21

## 2025-01-28 ENCOUNTER — TRANSCRIPTION ENCOUNTER (OUTPATIENT)
Age: 55
End: 2025-01-28

## 2025-03-11 ENCOUNTER — NON-APPOINTMENT (OUTPATIENT)
Age: 55
End: 2025-03-11

## 2025-03-13 ENCOUNTER — APPOINTMENT (OUTPATIENT)
Dept: ENDOCRINOLOGY | Facility: CLINIC | Age: 55
End: 2025-03-13
Payer: COMMERCIAL

## 2025-03-13 DIAGNOSIS — Z79.4 TYPE 2 DIABETES MELLITUS WITH OTHER SPECIFIED COMPLICATION: ICD-10-CM

## 2025-03-13 DIAGNOSIS — E11.69 TYPE 2 DIABETES MELLITUS WITH OTHER SPECIFIED COMPLICATION: ICD-10-CM

## 2025-03-13 PROCEDURE — G0108 DIAB MANAGE TRN  PER INDIV: CPT | Mod: 95

## 2025-04-11 ENCOUNTER — TRANSCRIPTION ENCOUNTER (OUTPATIENT)
Age: 55
End: 2025-04-11

## 2025-05-12 ENCOUNTER — NON-APPOINTMENT (OUTPATIENT)
Age: 55
End: 2025-05-12

## 2025-05-12 ENCOUNTER — APPOINTMENT (OUTPATIENT)
Dept: OPHTHALMOLOGY | Facility: CLINIC | Age: 55
End: 2025-05-12
Payer: COMMERCIAL

## 2025-05-12 PROCEDURE — 92133 CPTRZD OPH DX IMG PST SGM ON: CPT

## 2025-05-12 PROCEDURE — 92004 COMPRE OPH EXAM NEW PT 1/>: CPT

## 2025-05-24 ENCOUNTER — NON-APPOINTMENT (OUTPATIENT)
Age: 55
End: 2025-05-24

## 2025-05-28 ENCOUNTER — APPOINTMENT (OUTPATIENT)
Dept: UROLOGY | Facility: CLINIC | Age: 55
End: 2025-05-28
Payer: COMMERCIAL

## 2025-05-28 VITALS
BODY MASS INDEX: 33.13 KG/M2 | HEART RATE: 84 BPM | WEIGHT: 250 LBS | DIASTOLIC BLOOD PRESSURE: 81 MMHG | SYSTOLIC BLOOD PRESSURE: 147 MMHG | HEIGHT: 73 IN

## 2025-05-28 DIAGNOSIS — N52.9 MALE ERECTILE DYSFUNCTION, UNSPECIFIED: ICD-10-CM

## 2025-05-28 DIAGNOSIS — R68.82 DECREASED LIBIDO: ICD-10-CM

## 2025-05-28 DIAGNOSIS — B35.6 TINEA CRURIS: ICD-10-CM

## 2025-05-28 PROCEDURE — 99204 OFFICE O/P NEW MOD 45 MIN: CPT

## 2025-05-28 PROCEDURE — G2211 COMPLEX E/M VISIT ADD ON: CPT

## 2025-05-28 RX ORDER — NYSTATIN 100000 [USP'U]/G
100000 POWDER TOPICAL TWICE DAILY
Qty: 1 | Refills: 0 | Status: ACTIVE | COMMUNITY
Start: 2025-05-28 | End: 1900-01-01

## 2025-05-28 RX ORDER — TADALAFIL 5 MG/1
5 TABLET ORAL
Qty: 90 | Refills: 3 | Status: ACTIVE | COMMUNITY
Start: 2025-05-28 | End: 1900-01-01

## 2025-05-29 ENCOUNTER — APPOINTMENT (OUTPATIENT)
Age: 55
End: 2025-05-29
Payer: COMMERCIAL

## 2025-05-29 VITALS — WEIGHT: 250 LBS | HEIGHT: 73 IN | BODY MASS INDEX: 33.13 KG/M2

## 2025-05-29 DIAGNOSIS — E11.9 TYPE 2 DIABETES MELLITUS W/OUT COMPLICATIONS: ICD-10-CM

## 2025-05-29 DIAGNOSIS — B35.1 TINEA UNGUIUM: ICD-10-CM

## 2025-05-29 DIAGNOSIS — B35.3 TINEA PEDIS: ICD-10-CM

## 2025-05-29 PROCEDURE — 11721 DEBRIDE NAIL 6 OR MORE: CPT

## 2025-05-29 PROCEDURE — 99204 OFFICE O/P NEW MOD 45 MIN: CPT | Mod: 25

## 2025-06-03 RX ORDER — TERBINAFINE HYDROCHLORIDE 250 MG/1
250 TABLET ORAL DAILY
Qty: 60 | Refills: 1 | Status: ACTIVE | COMMUNITY
Start: 2025-06-03 | End: 2025-10-01

## 2025-07-14 ENCOUNTER — APPOINTMENT (OUTPATIENT)
Dept: OPHTHALMOLOGY | Facility: CLINIC | Age: 55
End: 2025-07-14
Payer: COMMERCIAL

## 2025-07-14 ENCOUNTER — NON-APPOINTMENT (OUTPATIENT)
Age: 55
End: 2025-07-14

## 2025-07-14 PROCEDURE — 92014 COMPRE OPH EXAM EST PT 1/>: CPT

## 2025-07-14 PROCEDURE — 92134 CPTRZ OPH DX IMG PST SGM RTA: CPT

## 2025-07-31 ENCOUNTER — LABORATORY RESULT (OUTPATIENT)
Age: 55
End: 2025-07-31

## 2025-07-31 ENCOUNTER — APPOINTMENT (OUTPATIENT)
Age: 55
End: 2025-07-31
Payer: COMMERCIAL

## 2025-07-31 DIAGNOSIS — Z79.4 TYPE 2 DIABETES MELLITUS WITH OTHER SPECIFIED COMPLICATION: ICD-10-CM

## 2025-07-31 DIAGNOSIS — E11.69 TYPE 2 DIABETES MELLITUS WITH OTHER SPECIFIED COMPLICATION: ICD-10-CM

## 2025-07-31 DIAGNOSIS — B35.1 TINEA UNGUIUM: ICD-10-CM

## 2025-07-31 PROCEDURE — 99213 OFFICE O/P EST LOW 20 MIN: CPT | Mod: 25

## 2025-07-31 PROCEDURE — 11721 DEBRIDE NAIL 6 OR MORE: CPT | Mod: Q8

## 2025-08-13 ENCOUNTER — APPOINTMENT (OUTPATIENT)
Dept: UROLOGY | Facility: CLINIC | Age: 55
End: 2025-08-13
Payer: COMMERCIAL

## 2025-08-13 VITALS
HEART RATE: 84 BPM | WEIGHT: 250 LBS | DIASTOLIC BLOOD PRESSURE: 83 MMHG | SYSTOLIC BLOOD PRESSURE: 154 MMHG | BODY MASS INDEX: 32.98 KG/M2

## 2025-08-13 DIAGNOSIS — N52.9 MALE ERECTILE DYSFUNCTION, UNSPECIFIED: ICD-10-CM

## 2025-08-13 PROCEDURE — 99213 OFFICE O/P EST LOW 20 MIN: CPT

## 2025-08-13 PROCEDURE — G2211 COMPLEX E/M VISIT ADD ON: CPT

## 2025-09-11 ENCOUNTER — APPOINTMENT (OUTPATIENT)
Dept: DERMATOLOGY | Facility: CLINIC | Age: 55
End: 2025-09-11
Payer: COMMERCIAL

## 2025-09-11 DIAGNOSIS — L20.9 ATOPIC DERMATITIS, UNSPECIFIED: ICD-10-CM

## 2025-09-11 PROCEDURE — G2211 COMPLEX E/M VISIT ADD ON: CPT

## 2025-09-11 PROCEDURE — 99204 OFFICE O/P NEW MOD 45 MIN: CPT

## 2025-09-11 RX ORDER — FLUTICASONE PROPIONATE 0.05 MG/G
0.01 OINTMENT TOPICAL
Qty: 1 | Refills: 3 | Status: ACTIVE | COMMUNITY
Start: 2025-09-11 | End: 1900-01-01